# Patient Record
Sex: MALE | Race: WHITE | NOT HISPANIC OR LATINO | Employment: OTHER | URBAN - METROPOLITAN AREA
[De-identification: names, ages, dates, MRNs, and addresses within clinical notes are randomized per-mention and may not be internally consistent; named-entity substitution may affect disease eponyms.]

---

## 2018-10-13 ENCOUNTER — OFFICE VISIT (OUTPATIENT)
Dept: URGENT CARE | Facility: CLINIC | Age: 68
End: 2018-10-13
Payer: MEDICARE

## 2018-10-13 ENCOUNTER — APPOINTMENT (OUTPATIENT)
Dept: RADIOLOGY | Facility: CLINIC | Age: 68
End: 2018-10-13
Payer: MEDICARE

## 2018-10-13 VITALS
RESPIRATION RATE: 18 BRPM | SYSTOLIC BLOOD PRESSURE: 146 MMHG | OXYGEN SATURATION: 97 % | HEART RATE: 72 BPM | TEMPERATURE: 97.9 F | DIASTOLIC BLOOD PRESSURE: 71 MMHG

## 2018-10-13 DIAGNOSIS — S62.304A CLOSED NONDISPLACED FRACTURE OF FOURTH METACARPAL BONE OF RIGHT HAND, UNSPECIFIED PORTION OF METACARPAL, INITIAL ENCOUNTER: ICD-10-CM

## 2018-10-13 DIAGNOSIS — M79.641 RIGHT HAND PAIN: Primary | ICD-10-CM

## 2018-10-13 DIAGNOSIS — M79.641 RIGHT HAND PAIN: ICD-10-CM

## 2018-10-13 PROCEDURE — G0463 HOSPITAL OUTPT CLINIC VISIT: HCPCS | Performed by: PHYSICIAN ASSISTANT

## 2018-10-13 PROCEDURE — 73130 X-RAY EXAM OF HAND: CPT

## 2018-10-13 PROCEDURE — 29125 APPL SHORT ARM SPLINT STATIC: CPT | Performed by: PHYSICIAN ASSISTANT

## 2018-10-13 PROCEDURE — 99203 OFFICE O/P NEW LOW 30 MIN: CPT | Performed by: PHYSICIAN ASSISTANT

## 2018-10-13 RX ORDER — METOPROLOL SUCCINATE 25 MG/1
25 TABLET, EXTENDED RELEASE ORAL DAILY
COMMUNITY
Start: 2018-09-30 | End: 2019-10-02 | Stop reason: HOSPADM

## 2018-10-13 RX ORDER — APIXABAN 5 MG/1
5 TABLET, FILM COATED ORAL 2 TIMES DAILY
COMMUNITY
Start: 2018-09-07

## 2018-10-13 RX ORDER — BUDESONIDE AND FORMOTEROL FUMARATE DIHYDRATE 160; 4.5 UG/1; UG/1
2 AEROSOL RESPIRATORY (INHALATION) 2 TIMES DAILY
COMMUNITY
Start: 2018-08-29

## 2018-10-13 RX ORDER — LEVOTHYROXINE SODIUM 112 UG/1
112 TABLET ORAL DAILY
COMMUNITY
Start: 2018-10-01

## 2018-10-13 RX ORDER — LISINOPRIL 40 MG/1
40 TABLET ORAL DAILY
Status: ON HOLD | COMMUNITY
Start: 2018-10-01 | End: 2019-10-02 | Stop reason: SDUPTHER

## 2018-10-13 RX ORDER — SIMVASTATIN 20 MG
20 TABLET ORAL
COMMUNITY
Start: 2018-10-01

## 2018-10-13 NOTE — PROGRESS NOTES
[address]  (office) [phone]  (fax) [phone]        NAME: Corina Dotson is a 70 y o  male  : 1950    MRN: [id number]  DATE: 2018  TIME: 4:27 PM    Assessment and Plan   Right hand pain [M79 641]  1  Right hand pain  XR hand 3+ vw right   2  Closed nondisplaced fracture of fourth metacarpal bone of right hand, unspecified portion of metacarpal, initial encounter  Sling    Ambulatory referral to Orthopedic Surgery       Patient Instructions   Xray shows acute nondisplaced fracture of 4th metacarpal  Splint applied  Given splint instructions and to follow up with ortho on Monday  OTC IBU and or tylenol for pain  To keep splint and sling on until sees ortho  Patient did verbalize understanding  To present to the ER if symptoms worsen  Chief Complaint     Chief Complaint   Patient presents with    Hand Pain     Pt c/o right hand pain after feeling something pop earlier today  History of Present Illness   Anita Tirado presents to the clinic c/o    Hand Pain    The incident occurred 1 to 3 hours ago  The incident occurred at home  Injury mechanism: lifting a heavy coffee creamer out of the fridge  The pain is present in the right hand  The quality of the pain is described as aching  The pain does not radiate  The pain is moderate  The pain has been constant since the incident  Pertinent negatives include no chest pain, muscle weakness, numbness or tingling  The symptoms are aggravated by movement and palpation  He has tried nothing for the symptoms  The treatment provided no relief  Review of Systems   Review of Systems   Constitutional: Negative for activity change, appetite change, chills, diaphoresis, fatigue and fever  HENT: Negative for congestion, ear discharge, ear pain, facial swelling, rhinorrhea, sinus pain, sinus pressure, sneezing and sore throat      Eyes: Negative for photophobia, pain, discharge, redness, itching and visual disturbance  Respiratory: Negative for apnea, cough, chest tightness, shortness of breath and wheezing  Cardiovascular: Negative for chest pain  Gastrointestinal: Negative for abdominal distention, abdominal pain, anal bleeding, blood in stool, constipation, diarrhea, nausea and vomiting  Genitourinary: Negative for dysuria, flank pain, frequency, hematuria and urgency  Musculoskeletal: Positive for arthralgias  Negative for back pain, gait problem, joint swelling, myalgias, neck pain and neck stiffness  Skin: Negative for color change, rash and wound  Allergic/Immunologic: Negative for immunocompromised state  Neurological: Negative for dizziness, tingling, facial asymmetry, numbness and headaches  Hematological: Negative for adenopathy  Psychiatric/Behavioral: Negative for confusion and decreased concentration  Current Medications     Long-Term Prescriptions   Medication Sig Dispense Refill    ELIQUIS 5 MG       levothyroxine 112 mcg tablet       lisinopril (ZESTRIL) 40 mg tablet       metoprolol succinate (TOPROL-XL) 25 mg 24 hr tablet       simvastatin (ZOCOR) 20 mg tablet       SYMBICORT 160-4 5 MCG/ACT inhaler          Current Allergies     Allergies as of 10/13/2018    (No Known Allergies)            The following portions of the patient's history were reviewed and updated as appropriate: allergies, current medications, past family history, past medical history, past social history, past surgical history and problem list   No past medical history on file  No past surgical history on file  Social History     Social History    Marital status: /Civil Union     Spouse name: N/A    Number of children: N/A    Years of education: N/A     Occupational History    Not on file       Social History Main Topics    Smoking status: Not on file    Smokeless tobacco: Not on file    Alcohol use Not on file    Drug use: Unknown    Sexual activity: Not on file     Other Topics Concern    Not on file     Social History Narrative    No narrative on file       Objective   /71   Pulse 72   Temp 97 9 °F (36 6 °C)   Resp 18   SpO2 97%      Physical Exam     Physical Exam   Constitutional: He is oriented to person, place, and time  He appears well-developed and well-nourished  No distress  HENT:   Head: Normocephalic and atraumatic  Right Ear: Tympanic membrane and external ear normal    Left Ear: Tympanic membrane and external ear normal    Nose: Nose normal    Mouth/Throat: Oropharynx is clear and moist  No oropharyngeal exudate  Eyes: Pupils are equal, round, and reactive to light  Conjunctivae and EOM are normal  Right eye exhibits no discharge  Left eye exhibits no discharge  No scleral icterus  Neck: Normal range of motion  Neck supple  No JVD present  No tracheal deviation present  No thyromegaly present  Cardiovascular: Normal rate, regular rhythm and normal heart sounds  Exam reveals no gallop and no friction rub  No murmur heard  Pulmonary/Chest: Effort normal and breath sounds normal  No stridor  No respiratory distress  He has no wheezes  He has no rales  He exhibits no tenderness  Musculoskeletal: He exhibits tenderness  He exhibits no deformity  Right hand: He exhibits tenderness (around 4th metacarpal), bony tenderness and swelling (mild)  He exhibits normal range of motion and normal capillary refill  Normal sensation noted  Decreased sensation is not present in the ulnar distribution, is not present in the medial distribution and is not present in the radial distribution  Radial and ulnar pulse 2+ on right; neurovascularly intact   Lymphadenopathy:     He has no cervical adenopathy  Neurological: He is alert and oriented to person, place, and time  He has normal reflexes  Coordination normal    Skin: Skin is warm and dry  No rash noted  He is not diaphoretic  No erythema  No pallor     Psychiatric: He has a normal mood and affect  His behavior is normal  Judgment and thought content normal    Nursing note and vitals reviewed  Splint application  Date/Time: 10/13/2018 12:33 PM  Performed by: Grace Waldrop  Authorized by: Grace Waldrop     Consent:     Consent obtained:  Verbal    Consent given by:  Patient    Risks discussed:  Discoloration, numbness, pain and swelling    Alternatives discussed:  No treatment, delayed treatment, alternative treatment, observation and referral  Pre-procedure details:     Sensation:  Normal  Procedure details:     Laterality:  Right    Location:  Arm    Arm:  R lower arm    Strapping: no  Cast type:  Short arm    Splint type:  Ulnar gutter    Supplies:  Ortho-Glass, cotton padding and sling  Post-procedure details:     Pain:  Unchanged    Sensation:  Normal    Patient tolerance of procedure:   Tolerated well, no immediate complications       doron Bañuelos PA-C

## 2019-03-25 ENCOUNTER — ANESTHESIA EVENT (OUTPATIENT)
Dept: PERIOP | Facility: HOSPITAL | Age: 69
End: 2019-03-25
Payer: MEDICARE

## 2019-03-25 RX ORDER — ESOMEPRAZOLE MAGNESIUM 40 MG/1
40 CAPSULE, DELAYED RELEASE ORAL
COMMUNITY
End: 2021-09-21 | Stop reason: HOSPADM

## 2019-03-25 RX ORDER — ECHINACEA 400 MG
2000 CAPSULE ORAL DAILY
COMMUNITY

## 2019-03-25 RX ORDER — TRAVOPROST OPHTHALMIC SOLUTION 0.04 MG/ML
1 SOLUTION OPHTHALMIC
COMMUNITY

## 2019-03-25 RX ORDER — LORATADINE 10 MG/1
10 TABLET ORAL DAILY
COMMUNITY

## 2019-03-25 RX ORDER — ALBUTEROL SULFATE 90 UG/1
2 AEROSOL, METERED RESPIRATORY (INHALATION) EVERY 6 HOURS PRN
COMMUNITY

## 2019-03-25 NOTE — PRE-PROCEDURE INSTRUCTIONS
Pre-Surgery Instructions:   Medication Instructions    albuterol (PROVENTIL HFA,VENTOLIN HFA) 90 mcg/act inhaler Instructed patient per Anesthesia Guidelines   esomeprazole (NexIUM) 40 MG capsule Instructed patient per Anesthesia Guidelines   lisinopril (ZESTRIL) 40 mg tablet Instructed patient per Anesthesia Guidelines   loratadine (CLARITIN) 10 mg tablet Instructed patient per Anesthesia Guidelines   metoprolol succinate (TOPROL-XL) 25 mg 24 hr tablet Instructed patient per Anesthesia Guidelines   SYMBICORT 160-4 5 MCG/ACT inhaler Instructed patient per Anesthesia Guidelines

## 2019-03-25 NOTE — ANESTHESIA PREPROCEDURE EVALUATION
Review of Systems/Medical History  Patient summary reviewed  Chart reviewed      Cardiovascular  Exercise tolerance (METS): >4,  Hyperlipidemia, CAD , Dysrhythmias , atrial fibrillation,   Comment: Ischemic cardiomyopathy with h/o afib  Pt has pacemaker defibrillator implanted 1 yr ago ,  Pulmonary  Sleep apnea CPAP,        GI/Hepatic  Negative GI/hepatic ROS   GERD well controlled,             Endo/Other  Negative endo/other ROS      GYN  Negative gynecology ROS          Hematology  Negative hematology ROS      Musculoskeletal  Negative musculoskeletal ROS   Arthritis     Neurology  Negative neurology ROS      Psychology   Negative psychology ROS              Physical Exam    Airway    Mallampati score: II  TM Distance: >3 FB  Neck ROM: full     Dental       Cardiovascular  Cardiovascular exam normal    Pulmonary  Pulmonary exam normal     Other Findings        Anesthesia Plan  ASA Score- 2     Anesthesia Type- IV sedation with anesthesia with ASA Monitors  Additional Monitors: arterial line  Airway Plan:     Comment: discussed possible A line if BP unstable   Plan Factors-Patient not instructed to abstain from smoking on day of procedure  Patient did not smoke on day of surgery  Induction- intravenous  Postoperative Plan- Plan for postoperative opioid use  Informed Consent- Anesthetic plan and risks discussed with patient and spouse  I personally reviewed this patient with the CRNA  Discussed and agreed on the Anesthesia Plan with the CRNA  Vidal Rodriguez         No results found for: HGBA1C    No results found for: NA, K, CL, CO2, ANIONGAP, BUN, CREATININE, GLUCOSE, GLUF, CALCIUM, CORRECTEDCA, AST, ALT, ALKPHOS, PROT, BILITOT, EGFR    No results found for: WBC, HGB, HCT, MCV, PLT  Cbc ok   EKG incomplete RBBB   Pt on eliquis

## 2019-03-26 ENCOUNTER — ANESTHESIA (OUTPATIENT)
Dept: PERIOP | Facility: HOSPITAL | Age: 69
End: 2019-03-26
Payer: MEDICARE

## 2019-03-26 ENCOUNTER — HOSPITAL ENCOUNTER (OUTPATIENT)
Facility: HOSPITAL | Age: 69
Setting detail: OUTPATIENT SURGERY
Discharge: HOME | End: 2019-03-26
Attending: OPHTHALMOLOGY | Admitting: OPHTHALMOLOGY
Payer: MEDICARE

## 2019-03-26 VITALS
TEMPERATURE: 97.4 F | OXYGEN SATURATION: 94 % | RESPIRATION RATE: 18 BRPM | WEIGHT: 224 LBS | HEIGHT: 70 IN | SYSTOLIC BLOOD PRESSURE: 133 MMHG | DIASTOLIC BLOOD PRESSURE: 62 MMHG | BODY MASS INDEX: 32.07 KG/M2 | HEART RATE: 62 BPM

## 2019-03-26 PROBLEM — H25.811 COMBINED FORMS OF AGE-RELATED CATARACT OF RIGHT EYE: Status: ACTIVE | Noted: 2019-03-26

## 2019-03-26 PROBLEM — H25.811 COMBINED FORMS OF AGE-RELATED CATARACT OF RIGHT EYE: Status: RESOLVED | Noted: 2019-03-26 | Resolved: 2019-03-26

## 2019-03-26 PROCEDURE — V2788 PRESBYOPIA-CORRECT FUNCTION: HCPCS | Performed by: OPHTHALMOLOGY

## 2019-03-26 DEVICE — ACRYSOF(R) IQ RESTOR(R) MULTIFOCAL IOL, SINGLE-PIECE ACRYLIC FOLDABLE LENS, UV WITH BLUE LIGHTFILTER, 13.0MM LENGTH, 6.0MM APODIZEDDIFFRACTIVE ASPHERIC OPTIC.
Type: IMPLANTABLE DEVICE | Site: POSTERIOR CHAMBER | Status: NON-FUNCTIONAL
Brand: ACRYSOF® RESTOR®
Removed: 2019-04-09

## 2019-03-26 RX ORDER — FENTANYL CITRATE 50 UG/ML
INJECTION, SOLUTION INTRAMUSCULAR; INTRAVENOUS AS NEEDED
Status: DISCONTINUED | OUTPATIENT
Start: 2019-03-26 | End: 2019-03-26 | Stop reason: SURG

## 2019-03-26 RX ORDER — ONDANSETRON 4 MG/1
4 TABLET, ORALLY DISINTEGRATING ORAL ONCE
Status: COMPLETED | OUTPATIENT
Start: 2019-03-26 | End: 2019-03-26

## 2019-03-26 RX ORDER — HYDROMORPHONE HCL/PF 1 MG/ML
0.5 SYRINGE (ML) INJECTION
Status: DISCONTINUED | OUTPATIENT
Start: 2019-03-26 | End: 2019-03-26 | Stop reason: HOSPADM

## 2019-03-26 RX ORDER — LIDOCAINE HYDROCHLORIDE 20 MG/ML
INJECTION, SOLUTION EPIDURAL; INFILTRATION; INTRACAUDAL; PERINEURAL AS NEEDED
Status: DISCONTINUED | OUTPATIENT
Start: 2019-03-26 | End: 2019-03-26 | Stop reason: HOSPADM

## 2019-03-26 RX ORDER — LIDOCAINE HYDROCHLORIDE 10 MG/ML
INJECTION, SOLUTION EPIDURAL; INFILTRATION; INTRACAUDAL; PERINEURAL AS NEEDED
Status: DISCONTINUED | OUTPATIENT
Start: 2019-03-26 | End: 2019-03-26 | Stop reason: HOSPADM

## 2019-03-26 RX ORDER — PHENYLEPHRINE HCL 2.5 %
1 DROPS OPHTHALMIC (EYE)
Status: COMPLETED | OUTPATIENT
Start: 2019-03-26 | End: 2019-03-26

## 2019-03-26 RX ORDER — TETRACAINE HYDROCHLORIDE 5 MG/ML
SOLUTION OPHTHALMIC AS NEEDED
Status: DISCONTINUED | OUTPATIENT
Start: 2019-03-26 | End: 2019-03-26 | Stop reason: HOSPADM

## 2019-03-26 RX ORDER — LIDOCAINE HYDROCHLORIDE 20 MG/ML
JELLY TOPICAL AS NEEDED
Status: DISCONTINUED | OUTPATIENT
Start: 2019-03-26 | End: 2019-03-26 | Stop reason: HOSPADM

## 2019-03-26 RX ORDER — SODIUM CHLORIDE 9 MG/ML
50 INJECTION, SOLUTION INTRAVENOUS CONTINUOUS
Status: DISCONTINUED | OUTPATIENT
Start: 2019-03-27 | End: 2019-03-26 | Stop reason: HOSPADM

## 2019-03-26 RX ORDER — MIDAZOLAM HYDROCHLORIDE 1 MG/ML
INJECTION INTRAMUSCULAR; INTRAVENOUS AS NEEDED
Status: DISCONTINUED | OUTPATIENT
Start: 2019-03-26 | End: 2019-03-26 | Stop reason: SURG

## 2019-03-26 RX ORDER — KETOROLAC TROMETHAMINE 5 MG/ML
1 SOLUTION OPHTHALMIC
Status: DISPENSED | OUTPATIENT
Start: 2019-03-26 | End: 2019-03-26

## 2019-03-26 RX ORDER — NEOMYCIN SULFATE, POLYMYXIN B SULFATE, AND DEXAMETHASONE 3.5; 10000; 1 MG/G; [USP'U]/G; MG/G
OINTMENT OPHTHALMIC AS NEEDED
Status: DISCONTINUED | OUTPATIENT
Start: 2019-03-26 | End: 2019-03-26 | Stop reason: HOSPADM

## 2019-03-26 RX ORDER — CYCLOPENTOLATE HYDROCHLORIDE 10 MG/ML
1 SOLUTION/ DROPS OPHTHALMIC
Status: COMPLETED | OUTPATIENT
Start: 2019-03-26 | End: 2019-03-26

## 2019-03-26 RX ORDER — BALANCED SALT SOLUTION 6.4; .75; .48; .3; 3.9; 1.7 MG/ML; MG/ML; MG/ML; MG/ML; MG/ML; MG/ML
SOLUTION OPHTHALMIC AS NEEDED
Status: DISCONTINUED | OUTPATIENT
Start: 2019-03-26 | End: 2019-03-26 | Stop reason: HOSPADM

## 2019-03-26 RX ORDER — ACETAMINOPHEN 325 MG/1
650 TABLET ORAL EVERY 4 HOURS PRN
Status: DISCONTINUED | OUTPATIENT
Start: 2019-03-26 | End: 2019-03-26 | Stop reason: HOSPADM

## 2019-03-26 RX ORDER — ACETAZOLAMIDE 250 MG/1
250 TABLET ORAL ONCE
Status: COMPLETED | OUTPATIENT
Start: 2019-03-26 | End: 2019-03-26

## 2019-03-26 RX ORDER — TROPICAMIDE 10 MG/ML
1 SOLUTION/ DROPS OPHTHALMIC
Status: COMPLETED | OUTPATIENT
Start: 2019-03-26 | End: 2019-03-26

## 2019-03-26 RX ADMIN — MIDAZOLAM HYDROCHLORIDE 1 MG: 1 INJECTION, SOLUTION INTRAMUSCULAR; INTRAVENOUS at 09:35

## 2019-03-26 RX ADMIN — KETOROLAC TROMETHAMINE 1 DROP: 5 SOLUTION OPHTHALMIC at 08:15

## 2019-03-26 RX ADMIN — TROPICAMIDE 1 DROP: 10 SOLUTION/ DROPS OPHTHALMIC at 08:29

## 2019-03-26 RX ADMIN — TROPICAMIDE 1 DROP: 10 SOLUTION/ DROPS OPHTHALMIC at 09:03

## 2019-03-26 RX ADMIN — HYDROMORPHONE HYDROCHLORIDE 0.5 MG: 1 INJECTION, SOLUTION INTRAMUSCULAR; INTRAVENOUS; SUBCUTANEOUS at 10:13

## 2019-03-26 RX ADMIN — MIDAZOLAM HYDROCHLORIDE 1 MG: 1 INJECTION, SOLUTION INTRAMUSCULAR; INTRAVENOUS at 09:33

## 2019-03-26 RX ADMIN — PHENYLEPHRINE HYDROCHLORIDE 1 DROP: 25 SOLUTION/ DROPS OPHTHALMIC at 08:15

## 2019-03-26 RX ADMIN — HYDROMORPHONE HYDROCHLORIDE 0.5 MG: 1 INJECTION, SOLUTION INTRAMUSCULAR; INTRAVENOUS; SUBCUTANEOUS at 10:23

## 2019-03-26 RX ADMIN — TROPICAMIDE 1 DROP: 10 SOLUTION/ DROPS OPHTHALMIC at 08:49

## 2019-03-26 RX ADMIN — TROPICAMIDE 1 DROP: 10 SOLUTION/ DROPS OPHTHALMIC at 08:16

## 2019-03-26 RX ADMIN — PHENYLEPHRINE HYDROCHLORIDE 1 DROP: 25 SOLUTION/ DROPS OPHTHALMIC at 09:04

## 2019-03-26 RX ADMIN — CYCLOPENTOLATE HYDROCHLORIDE 1 DROP: 10 SOLUTION/ DROPS OPHTHALMIC at 08:49

## 2019-03-26 RX ADMIN — ONDANSETRON 4 MG: 4 TABLET, ORALLY DISINTEGRATING ORAL at 13:17

## 2019-03-26 RX ADMIN — CYCLOPENTOLATE HYDROCHLORIDE 1 DROP: 10 SOLUTION/ DROPS OPHTHALMIC at 08:15

## 2019-03-26 RX ADMIN — ACETAZOLAMIDE 250 MG: 250 TABLET ORAL at 12:51

## 2019-03-26 RX ADMIN — CYCLOPENTOLATE HYDROCHLORIDE 1 DROP: 10 SOLUTION/ DROPS OPHTHALMIC at 09:03

## 2019-03-26 RX ADMIN — SODIUM CHLORIDE 50 ML/HR: 9 INJECTION, SOLUTION INTRAVENOUS at 09:02

## 2019-03-26 RX ADMIN — PHENYLEPHRINE HYDROCHLORIDE 1 DROP: 25 SOLUTION/ DROPS OPHTHALMIC at 08:49

## 2019-03-26 RX ADMIN — KETOROLAC TROMETHAMINE 1 DROP: 5 SOLUTION OPHTHALMIC at 08:49

## 2019-03-26 RX ADMIN — FENTANYL CITRATE 50 MCG: 50 INJECTION INTRAMUSCULAR; INTRAVENOUS at 09:35

## 2019-03-26 RX ADMIN — CYCLOPENTOLATE HYDROCHLORIDE 1 DROP: 10 SOLUTION/ DROPS OPHTHALMIC at 08:28

## 2019-03-26 RX ADMIN — FENTANYL CITRATE 50 MCG: 50 INJECTION INTRAMUSCULAR; INTRAVENOUS at 09:33

## 2019-03-26 RX ADMIN — PHENYLEPHRINE HYDROCHLORIDE 1 DROP: 25 SOLUTION/ DROPS OPHTHALMIC at 08:28

## 2019-03-26 NOTE — DISCHARGE INSTR - AVS FIRST PAGE
Patient to follow up with Filomena De La O MD in the office tomorrow  Please bring your drops and your folder with your instruction sheet with you to the office  Please take your eye vitamins today and your acetazolamide, if prescribed, today with dinner and at bedtime, and again in the morning before your appointment  Please also read the post-op instruction sheet provided by our office

## 2019-03-26 NOTE — INTERVAL H&P NOTE
H&P reviewed  After examining the patient I find no changes in the patients condition since the H&P had been written   Right eye today

## 2019-03-26 NOTE — OP NOTE
OPERATIVE REPORT  PATIENT NAME: Corina Dotson    :  1950  MRN: [id number]  Pt Location:  OR ROOM 01    SURGERY DATE: 3/26/2019    Surgeon(s) and Role:     * Elio Andrade MD - Primary    Preop Diagnosis:  Combined forms of age-related cataract of right eye [H25 811]    Post-Op Diagnosis Codes:     * Combined forms of age-related cataract of right eye [H25 811]    Procedure(s) (LRB):  PHACO W/IOL & LRI (Right)    Specimen(s):  * No specimens in log *    Estimated Blood Loss:   Minimal    Drains:  * No LDAs found *    Anesthesia Type:   IV Sedation with Anesthesia    Operative Indications:  Combined forms of age-related cataract of right eye [H25 811]      Operative Findings:      Complications:   None    Procedure and Technique:    Phaco Cataract Extraction right eye with placement of SV25T0 Restor +2 5 IOL 16 0 Dioptor serial # [id number]  Clear corneal incision with LRI 70degrees around the 118 axis      The patient was brought to the operating room and placed supine on the operating room table after being identified by the surgeon  The patient was then given Alcaine  drops followed by 2% Lidocaine gel to the operative eye  A Nadbath block was given behind the ear on the operative site consisting of 0 5 mL of 2% plain Xylocaine  The lashes were then scrubbed with 5% Povidone iodine and then more of the 2% Lidocaine gel was placed in the eye  The face was then prepped and draped in the usual sterile fashion  A wire lid speculum was placed and the microscope was brought into position  A wick drain was placed in the inferior fornix  The eye was stabilized and the LRI was marked with a forceps  A 600 micron protected LRI knife was used to cut the lri in the premarked amount in the proper axis  This was checked with a weck cell and seen to be proper depth  Ocucoat was placed on the eye      Next, the eye was entered using the 2 8 blade (keratome) and Viscoelastic material was injected into the anterior chamber to deepen it  A paracentesis stab-wound was made with a #15 blade at the 2:30 position  Using the capsulorrhexis needle a large capsulorrhexis was performed in a curvilinear continuous fashion and then the anterior capsule piece was removed from the eye using forceps  Hydrodissection and hydrodelineation were then carried out using BSS from the bottle until good fluid waves were seen and the nucleus was loosened  Next the phacoemulsification handpiece was brought into the eye and grooving begun  The lens was then rotated and the nucleus was grooved further and cracked in half  Each half was then sculpted and cracked into quadrants  The quadrants were then removed using a pulse technique for emulsification and then the epinuclear shell was suctioned out using the bimodal setting  The I/A instrument was then inserted into the eye and was used to remove all remaining cortex  The anterior rim and posterior capsule were then polished using both the I/A on Cap Vac and using a capsule polisher  The bag was then inflated with Provisc  The intraocular lens wasinjected into the capsular bag and rotated into place  The lens was seen to be well centered  The I/A instrument was then used to remove all remaining Healon and Miochol was used to bring down the pupil  The lid speculum was removed and the eye was dressed with Maxitrol ointment in the eye  An eye pad was placed and taped into position and a shield was placed and taped into position  The patient tolerated the procedure extremely well and was taken to the recovery room in excellent condition       I was present for the entire procedure    Patient Disposition:  PACU     SIGNATURE: Felix Moreno MD  DATE: March 26, 2019  TIME: 10:08 AM

## 2019-03-26 NOTE — PERIOPERATIVE NURSING NOTE
Pt discharge via wheelchair, VSS, AAOx4  All discharge instructions reviewed, pt verbalized understanding, all questions answered  Nausea improved slightly  Dressing clean, dry, and intact  Eye shield in place

## 2019-03-26 NOTE — DISCHARGE INSTRUCTIONS
Cataracts   WHAT YOU NEED TO KNOW:   A cataract is a clouding of the eye lens  The lens is the opening where light passes through the eye  It is normally clear and focuses what you see onto the retina (back of the eye)  With cataracts, the cloudy lens makes it hard for light to pass through  This causes problems with correctly focusing what you see on the retina  Your vision may be cloudy, hazy, and blurred  You may develop a cataract in one or both eyes  DISCHARGE INSTRUCTIONS:   Return to the emergency department if:   · You suddenly lose your eyesight  · You feel a sudden, sharp pain in your eye  Contact your healthcare provider if:   · You have a fever  · You have chills, a cough, or feel weak and achy  · You have questions or concerns about your condition or care  Follow up with your healthcare provider as directed:  Write down your questions so you remember to ask them during your visits  Protect your eyes:   · Wear sunglasses  to protect your eyes from the sunlight and prevent eye discomfort  Make sure the sunglasses have UV protection  · Do not smoke  Nicotine and other chemicals in cigarettes and cigars can cause lung damage  Ask your healthcare provider for information if you currently smoke and need help to quit  E-cigarettes or smokeless tobacco still contain nicotine  Talk to your healthcare provider before you use these products  © 2017 [facility] Information is for End User's use only and may not be sold, redistributed or otherwise used for commercial purposes  All illustrations and images included in CareNotes® are the copyrighted property of A D A M [employer] Inc  or [employer] Cur[employer]  The above information is an  only  It is not intended as medical advice for individual conditions or treatments  Talk to your doctor, nurse or pharmacist before following any medical regimen to see if it is safe and effective for you

## 2019-03-26 NOTE — PERIOPERATIVE NURSING NOTE
Received pt from PACU, AAOx4, VSS, IV infusing  Pt denies pain, dizziness and nausea  Dressing clean, dry and intact, eye shield in place   Pt tolerating po/

## 2019-04-08 ENCOUNTER — ANESTHESIA EVENT (OUTPATIENT)
Dept: PERIOP | Facility: HOSPITAL | Age: 69
End: 2019-04-08
Payer: MEDICARE

## 2019-04-09 ENCOUNTER — HOSPITAL ENCOUNTER (OUTPATIENT)
Facility: HOSPITAL | Age: 69
Setting detail: OUTPATIENT SURGERY
Discharge: HOME | End: 2019-04-09
Attending: OPHTHALMOLOGY | Admitting: OPHTHALMOLOGY
Payer: MEDICARE

## 2019-04-09 ENCOUNTER — ANESTHESIA (OUTPATIENT)
Dept: PERIOP | Facility: HOSPITAL | Age: 69
End: 2019-04-09
Payer: MEDICARE

## 2019-04-09 VITALS
DIASTOLIC BLOOD PRESSURE: 80 MMHG | OXYGEN SATURATION: 96 % | RESPIRATION RATE: 20 BRPM | SYSTOLIC BLOOD PRESSURE: 127 MMHG | TEMPERATURE: 97.5 F | HEART RATE: 61 BPM

## 2019-04-09 DIAGNOSIS — T85.29XA OTHER MECHANICAL COMPLICATION OF INTRAOCULAR LENS, INITIAL ENCOUNTER: ICD-10-CM

## 2019-04-09 PROCEDURE — V2788 PRESBYOPIA-CORRECT FUNCTION: HCPCS | Performed by: OPHTHALMOLOGY

## 2019-04-09 DEVICE — ACRYSOF(R) IQ RESTOR(R) MULTIFOCAL IOL, SINGLE-PIECE ACRYLIC FOLDABLE LENS, UV WITH BLUE LIGHTFILTER, 13.0MM LENGTH, 6.0MM APODIZEDDIFFRACTIVE ASPHERIC OPTIC.
Type: IMPLANTABLE DEVICE | Status: FUNCTIONAL
Brand: ACRYSOF® RESTOR®

## 2019-04-09 RX ORDER — ONDANSETRON 2 MG/ML
INJECTION INTRAMUSCULAR; INTRAVENOUS AS NEEDED
Status: DISCONTINUED | OUTPATIENT
Start: 2019-04-09 | End: 2019-04-09 | Stop reason: SURG

## 2019-04-09 RX ORDER — TETRACAINE HYDROCHLORIDE 5 MG/ML
SOLUTION OPHTHALMIC AS NEEDED
Status: DISCONTINUED | OUTPATIENT
Start: 2019-04-09 | End: 2019-04-09 | Stop reason: HOSPADM

## 2019-04-09 RX ORDER — DEXAMETHASONE SODIUM PHOSPHATE 4 MG/ML
INJECTION, SOLUTION INTRA-ARTICULAR; INTRALESIONAL; INTRAMUSCULAR; INTRAVENOUS; SOFT TISSUE AS NEEDED
Status: DISCONTINUED | OUTPATIENT
Start: 2019-04-09 | End: 2019-04-09 | Stop reason: SURG

## 2019-04-09 RX ORDER — LIDOCAINE HYDROCHLORIDE 20 MG/ML
JELLY TOPICAL AS NEEDED
Status: DISCONTINUED | OUTPATIENT
Start: 2019-04-09 | End: 2019-04-09 | Stop reason: HOSPADM

## 2019-04-09 RX ORDER — PHENYLEPHRINE HCL 2.5 %
1 DROPS OPHTHALMIC (EYE)
Status: COMPLETED | OUTPATIENT
Start: 2019-04-09 | End: 2019-04-09

## 2019-04-09 RX ORDER — SODIUM CHLORIDE 9 MG/ML
125 INJECTION, SOLUTION INTRAVENOUS CONTINUOUS
Status: DISCONTINUED | OUTPATIENT
Start: 2019-04-09 | End: 2019-04-09 | Stop reason: HOSPADM

## 2019-04-09 RX ORDER — NEOMYCIN SULFATE, POLYMYXIN B SULFATE, AND DEXAMETHASONE 3.5; 10000; 1 MG/G; [USP'U]/G; MG/G
OINTMENT OPHTHALMIC AS NEEDED
Status: DISCONTINUED | OUTPATIENT
Start: 2019-04-09 | End: 2019-04-09 | Stop reason: HOSPADM

## 2019-04-09 RX ORDER — FENTANYL CITRATE/PF 50 MCG/ML
25 SYRINGE (ML) INJECTION
Status: DISCONTINUED | OUTPATIENT
Start: 2019-04-09 | End: 2019-04-09 | Stop reason: HOSPADM

## 2019-04-09 RX ORDER — ACETAMINOPHEN 325 MG/1
650 TABLET ORAL EVERY 4 HOURS PRN
Status: DISCONTINUED | OUTPATIENT
Start: 2019-04-09 | End: 2019-04-09 | Stop reason: HOSPADM

## 2019-04-09 RX ORDER — LIDOCAINE HYDROCHLORIDE 20 MG/ML
INJECTION, SOLUTION EPIDURAL; INFILTRATION; INTRACAUDAL; PERINEURAL AS NEEDED
Status: DISCONTINUED | OUTPATIENT
Start: 2019-04-09 | End: 2019-04-09 | Stop reason: HOSPADM

## 2019-04-09 RX ORDER — CYCLOPENTOLATE HYDROCHLORIDE 10 MG/ML
1 SOLUTION/ DROPS OPHTHALMIC
Status: COMPLETED | OUTPATIENT
Start: 2019-04-09 | End: 2019-04-09

## 2019-04-09 RX ORDER — LIDOCAINE HYDROCHLORIDE 10 MG/ML
INJECTION, SOLUTION EPIDURAL; INFILTRATION; INTRACAUDAL; PERINEURAL AS NEEDED
Status: DISCONTINUED | OUTPATIENT
Start: 2019-04-09 | End: 2019-04-09 | Stop reason: HOSPADM

## 2019-04-09 RX ORDER — FENTANYL CITRATE 50 UG/ML
INJECTION, SOLUTION INTRAMUSCULAR; INTRAVENOUS AS NEEDED
Status: DISCONTINUED | OUTPATIENT
Start: 2019-04-09 | End: 2019-04-09 | Stop reason: SURG

## 2019-04-09 RX ORDER — BALANCED SALT SOLUTION 6.4; .75; .48; .3; 3.9; 1.7 MG/ML; MG/ML; MG/ML; MG/ML; MG/ML; MG/ML
SOLUTION OPHTHALMIC AS NEEDED
Status: DISCONTINUED | OUTPATIENT
Start: 2019-04-09 | End: 2019-04-09 | Stop reason: HOSPADM

## 2019-04-09 RX ORDER — PROPOFOL 10 MG/ML
INJECTION, EMULSION INTRAVENOUS AS NEEDED
Status: DISCONTINUED | OUTPATIENT
Start: 2019-04-09 | End: 2019-04-09 | Stop reason: SURG

## 2019-04-09 RX ORDER — MIDAZOLAM HYDROCHLORIDE 1 MG/ML
INJECTION INTRAMUSCULAR; INTRAVENOUS AS NEEDED
Status: DISCONTINUED | OUTPATIENT
Start: 2019-04-09 | End: 2019-04-09 | Stop reason: SURG

## 2019-04-09 RX ORDER — SCOLOPAMINE TRANSDERMAL SYSTEM 1 MG/1
1 PATCH, EXTENDED RELEASE TRANSDERMAL
Status: DISCONTINUED | OUTPATIENT
Start: 2019-04-09 | End: 2019-04-09 | Stop reason: HOSPADM

## 2019-04-09 RX ORDER — TROPICAMIDE 10 MG/ML
1 SOLUTION/ DROPS OPHTHALMIC
Status: COMPLETED | OUTPATIENT
Start: 2019-04-09 | End: 2019-04-09

## 2019-04-09 RX ADMIN — PHENYLEPHRINE HYDROCHLORIDE 1 DROP: 25 SOLUTION/ DROPS OPHTHALMIC at 08:39

## 2019-04-09 RX ADMIN — CYCLOPENTOLATE HYDROCHLORIDE 1 DROP: 10 SOLUTION/ DROPS OPHTHALMIC at 08:39

## 2019-04-09 RX ADMIN — CYCLOPENTOLATE HYDROCHLORIDE 1 DROP: 10 SOLUTION/ DROPS OPHTHALMIC at 08:19

## 2019-04-09 RX ADMIN — ACETAMINOPHEN 650 MG: 325 TABLET ORAL at 11:57

## 2019-04-09 RX ADMIN — TROPICAMIDE 1 DROP: 10 SOLUTION/ DROPS OPHTHALMIC at 08:19

## 2019-04-09 RX ADMIN — TROPICAMIDE 1 DROP: 10 SOLUTION/ DROPS OPHTHALMIC at 08:01

## 2019-04-09 RX ADMIN — MIDAZOLAM HYDROCHLORIDE 2 MG: 1 INJECTION, SOLUTION INTRAMUSCULAR; INTRAVENOUS at 09:48

## 2019-04-09 RX ADMIN — DEXAMETHASONE SODIUM PHOSPHATE 4 MG: 4 INJECTION, SOLUTION INTRA-ARTICULAR; INTRALESIONAL; INTRAMUSCULAR; INTRAVENOUS; SOFT TISSUE at 09:55

## 2019-04-09 RX ADMIN — TROPICAMIDE 1 DROP: 10 SOLUTION/ DROPS OPHTHALMIC at 07:44

## 2019-04-09 RX ADMIN — SODIUM CHLORIDE: 9 INJECTION, SOLUTION INTRAVENOUS at 09:00

## 2019-04-09 RX ADMIN — CYCLOPENTOLATE HYDROCHLORIDE 1 DROP: 10 SOLUTION/ DROPS OPHTHALMIC at 08:01

## 2019-04-09 RX ADMIN — ONDANSETRON HYDROCHLORIDE 4 MG: 2 INJECTION, SOLUTION INTRAMUSCULAR; INTRAVENOUS at 09:55

## 2019-04-09 RX ADMIN — PROPOFOL 30 MG: 10 INJECTION, EMULSION INTRAVENOUS at 09:54

## 2019-04-09 RX ADMIN — CYCLOPENTOLATE HYDROCHLORIDE 1 DROP: 10 SOLUTION/ DROPS OPHTHALMIC at 07:44

## 2019-04-09 RX ADMIN — FENTANYL CITRATE 50 MCG: 50 INJECTION INTRAMUSCULAR; INTRAVENOUS at 09:51

## 2019-04-09 RX ADMIN — TROPICAMIDE 1 DROP: 10 SOLUTION/ DROPS OPHTHALMIC at 08:39

## 2019-04-09 RX ADMIN — SCOPALAMINE 1 PATCH: 1 PATCH, EXTENDED RELEASE TRANSDERMAL at 08:04

## 2019-04-09 RX ADMIN — FENTANYL CITRATE 25 MCG: 50 INJECTION INTRAMUSCULAR; INTRAVENOUS at 10:50

## 2019-04-09 RX ADMIN — PHENYLEPHRINE HYDROCHLORIDE 1 DROP: 25 SOLUTION/ DROPS OPHTHALMIC at 07:44

## 2019-04-09 RX ADMIN — PHENYLEPHRINE HYDROCHLORIDE 1 DROP: 25 SOLUTION/ DROPS OPHTHALMIC at 08:01

## 2019-04-09 RX ADMIN — SODIUM CHLORIDE 125 ML/HR: 9 INJECTION, SOLUTION INTRAVENOUS at 09:17

## 2019-04-09 RX ADMIN — PROPOFOL 30 MG: 10 INJECTION, EMULSION INTRAVENOUS at 10:31

## 2019-04-09 RX ADMIN — PHENYLEPHRINE HYDROCHLORIDE 1 DROP: 25 SOLUTION/ DROPS OPHTHALMIC at 08:19

## 2019-04-09 RX ADMIN — FENTANYL CITRATE 50 MCG: 50 INJECTION INTRAMUSCULAR; INTRAVENOUS at 09:48

## 2019-04-09 RX ADMIN — FENTANYL CITRATE 25 MCG: 50 INJECTION INTRAMUSCULAR; INTRAVENOUS at 11:13

## 2019-05-07 ENCOUNTER — ANESTHESIA (OUTPATIENT)
Dept: PERIOP | Facility: HOSPITAL | Age: 69
End: 2019-05-07
Payer: MEDICARE

## 2019-05-07 ENCOUNTER — ANESTHESIA EVENT (OUTPATIENT)
Dept: PERIOP | Facility: HOSPITAL | Age: 69
End: 2019-05-07
Payer: MEDICARE

## 2019-05-07 ENCOUNTER — HOSPITAL ENCOUNTER (OUTPATIENT)
Facility: HOSPITAL | Age: 69
Setting detail: OUTPATIENT SURGERY
Discharge: HOME | End: 2019-05-07
Attending: OPHTHALMOLOGY | Admitting: OPHTHALMOLOGY
Payer: MEDICARE

## 2019-05-07 VITALS
DIASTOLIC BLOOD PRESSURE: 70 MMHG | OXYGEN SATURATION: 92 % | TEMPERATURE: 96.6 F | SYSTOLIC BLOOD PRESSURE: 122 MMHG | HEART RATE: 61 BPM | RESPIRATION RATE: 16 BRPM

## 2019-05-07 PROBLEM — H25.812 COMBINED FORMS OF AGE-RELATED CATARACT OF LEFT EYE: Status: ACTIVE | Noted: 2019-05-07

## 2019-05-07 PROBLEM — H25.812 COMBINED FORMS OF AGE-RELATED CATARACT OF LEFT EYE: Status: RESOLVED | Noted: 2019-05-07 | Resolved: 2019-05-07

## 2019-05-07 PROCEDURE — V2788 PRESBYOPIA-CORRECT FUNCTION: HCPCS | Performed by: OPHTHALMOLOGY

## 2019-05-07 DEVICE — ACRYSOF(R) IQ RESTOR(R) MULTIFOCAL IOL, SINGLE-PIECE ACRYLIC FOLDABLE LENS, UV WITH BLUE LIGHTFILTER, 13.0MM LENGTH, 6.0MM APODIZEDDIFFRACTIVE ASPHERIC OPTIC.
Type: IMPLANTABLE DEVICE | Site: POSTERIOR CHAMBER | Status: FUNCTIONAL
Brand: ACRYSOF® RESTOR®

## 2019-05-07 RX ORDER — MIDAZOLAM HYDROCHLORIDE 1 MG/ML
INJECTION INTRAMUSCULAR; INTRAVENOUS AS NEEDED
Status: DISCONTINUED | OUTPATIENT
Start: 2019-05-07 | End: 2019-05-07 | Stop reason: SURG

## 2019-05-07 RX ORDER — ACETAMINOPHEN 325 MG/1
650 TABLET ORAL EVERY 4 HOURS PRN
Status: DISCONTINUED | OUTPATIENT
Start: 2019-05-07 | End: 2019-05-07 | Stop reason: HOSPADM

## 2019-05-07 RX ORDER — BALANCED SALT SOLUTION 6.4; .75; .48; .3; 3.9; 1.7 MG/ML; MG/ML; MG/ML; MG/ML; MG/ML; MG/ML
SOLUTION OPHTHALMIC AS NEEDED
Status: DISCONTINUED | OUTPATIENT
Start: 2019-05-07 | End: 2019-05-07 | Stop reason: HOSPADM

## 2019-05-07 RX ORDER — SCOLOPAMINE TRANSDERMAL SYSTEM 1 MG/1
1 PATCH, EXTENDED RELEASE TRANSDERMAL
Status: DISCONTINUED | OUTPATIENT
Start: 2019-05-07 | End: 2019-05-07 | Stop reason: HOSPADM

## 2019-05-07 RX ORDER — FENTANYL CITRATE 50 UG/ML
INJECTION, SOLUTION INTRAMUSCULAR; INTRAVENOUS AS NEEDED
Status: DISCONTINUED | OUTPATIENT
Start: 2019-05-07 | End: 2019-05-07 | Stop reason: SURG

## 2019-05-07 RX ORDER — CYCLOPENTOLATE HYDROCHLORIDE 10 MG/ML
1 SOLUTION/ DROPS OPHTHALMIC
Status: DISPENSED | OUTPATIENT
Start: 2019-05-07 | End: 2019-05-07

## 2019-05-07 RX ORDER — LIDOCAINE HYDROCHLORIDE 10 MG/ML
INJECTION, SOLUTION EPIDURAL; INFILTRATION; INTRACAUDAL; PERINEURAL AS NEEDED
Status: DISCONTINUED | OUTPATIENT
Start: 2019-05-07 | End: 2019-05-07 | Stop reason: HOSPADM

## 2019-05-07 RX ORDER — LIDOCAINE HYDROCHLORIDE 20 MG/ML
JELLY TOPICAL AS NEEDED
Status: DISCONTINUED | OUTPATIENT
Start: 2019-05-07 | End: 2019-05-07 | Stop reason: HOSPADM

## 2019-05-07 RX ORDER — LIDOCAINE HYDROCHLORIDE 20 MG/ML
INJECTION, SOLUTION EPIDURAL; INFILTRATION; INTRACAUDAL; PERINEURAL AS NEEDED
Status: DISCONTINUED | OUTPATIENT
Start: 2019-05-07 | End: 2019-05-07 | Stop reason: HOSPADM

## 2019-05-07 RX ORDER — PROPOFOL 10 MG/ML
INJECTION, EMULSION INTRAVENOUS AS NEEDED
Status: DISCONTINUED | OUTPATIENT
Start: 2019-05-07 | End: 2019-05-07 | Stop reason: SURG

## 2019-05-07 RX ORDER — FENTANYL CITRATE/PF 50 MCG/ML
25 SYRINGE (ML) INJECTION
Status: DISCONTINUED | OUTPATIENT
Start: 2019-05-07 | End: 2019-05-07 | Stop reason: HOSPADM

## 2019-05-07 RX ORDER — ACETAZOLAMIDE 250 MG/1
250 TABLET ORAL ONCE
Status: COMPLETED | OUTPATIENT
Start: 2019-05-07 | End: 2019-05-07

## 2019-05-07 RX ORDER — TROPICAMIDE 10 MG/ML
1 SOLUTION/ DROPS OPHTHALMIC
Status: DISPENSED | OUTPATIENT
Start: 2019-05-07 | End: 2019-05-07

## 2019-05-07 RX ORDER — KETOROLAC TROMETHAMINE 5 MG/ML
1 SOLUTION OPHTHALMIC
Status: DISPENSED | OUTPATIENT
Start: 2019-05-07 | End: 2019-05-07

## 2019-05-07 RX ORDER — TETRACAINE HYDROCHLORIDE 5 MG/ML
SOLUTION OPHTHALMIC AS NEEDED
Status: DISCONTINUED | OUTPATIENT
Start: 2019-05-07 | End: 2019-05-07 | Stop reason: HOSPADM

## 2019-05-07 RX ORDER — DEXAMETHASONE SODIUM PHOSPHATE 4 MG/ML
INJECTION, SOLUTION INTRA-ARTICULAR; INTRALESIONAL; INTRAMUSCULAR; INTRAVENOUS; SOFT TISSUE AS NEEDED
Status: DISCONTINUED | OUTPATIENT
Start: 2019-05-07 | End: 2019-05-07 | Stop reason: SURG

## 2019-05-07 RX ORDER — NEOMYCIN SULFATE, POLYMYXIN B SULFATE, AND DEXAMETHASONE 3.5; 10000; 1 MG/G; [USP'U]/G; MG/G
OINTMENT OPHTHALMIC AS NEEDED
Status: DISCONTINUED | OUTPATIENT
Start: 2019-05-07 | End: 2019-05-07 | Stop reason: HOSPADM

## 2019-05-07 RX ORDER — PHENYLEPHRINE HCL 2.5 %
1 DROPS OPHTHALMIC (EYE)
Status: DISPENSED | OUTPATIENT
Start: 2019-05-07 | End: 2019-05-07

## 2019-05-07 RX ORDER — SODIUM CHLORIDE 9 MG/ML
125 INJECTION, SOLUTION INTRAVENOUS CONTINUOUS
Status: DISCONTINUED | OUTPATIENT
Start: 2019-05-07 | End: 2019-05-07 | Stop reason: HOSPADM

## 2019-05-07 RX ADMIN — ACETAZOLAMIDE 250 MG: 250 TABLET ORAL at 11:34

## 2019-05-07 RX ADMIN — FENTANYL CITRATE 25 MCG: 50 INJECTION INTRAMUSCULAR; INTRAVENOUS at 10:10

## 2019-05-07 RX ADMIN — PROPOFOL 25 MG: 10 INJECTION, EMULSION INTRAVENOUS at 10:07

## 2019-05-07 RX ADMIN — PHENYLEPHRINE HYDROCHLORIDE 1 DROP: 25 SOLUTION/ DROPS OPHTHALMIC at 09:29

## 2019-05-07 RX ADMIN — FENTANYL CITRATE 50 MCG: 50 INJECTION INTRAMUSCULAR; INTRAVENOUS at 10:00

## 2019-05-07 RX ADMIN — SODIUM CHLORIDE 125 ML/HR: 9 INJECTION, SOLUTION INTRAVENOUS at 09:49

## 2019-05-07 RX ADMIN — ACETAMINOPHEN 650 MG: 325 TABLET ORAL at 11:33

## 2019-05-07 RX ADMIN — KETOROLAC TROMETHAMINE 1 DROP: 5 SOLUTION OPHTHALMIC at 09:29

## 2019-05-07 RX ADMIN — FENTANYL CITRATE 25 MCG: 50 INJECTION INTRAMUSCULAR; INTRAVENOUS at 10:44

## 2019-05-07 RX ADMIN — PHENYLEPHRINE HYDROCHLORIDE 1 DROP: 25 SOLUTION/ DROPS OPHTHALMIC at 09:04

## 2019-05-07 RX ADMIN — CYCLOPENTOLATE HYDROCHLORIDE 1 DROP: 10 SOLUTION/ DROPS OPHTHALMIC at 09:04

## 2019-05-07 RX ADMIN — KETOROLAC TROMETHAMINE 1 DROP: 5 SOLUTION OPHTHALMIC at 09:04

## 2019-05-07 RX ADMIN — PROPOFOL 10 MG: 10 INJECTION, EMULSION INTRAVENOUS at 10:10

## 2019-05-07 RX ADMIN — PHENYLEPHRINE HYDROCHLORIDE 1 DROP: 25 SOLUTION/ DROPS OPHTHALMIC at 09:48

## 2019-05-07 RX ADMIN — SCOPALAMINE 1 PATCH: 1 PATCH, EXTENDED RELEASE TRANSDERMAL at 09:08

## 2019-05-07 RX ADMIN — TROPICAMIDE 1 DROP: 10 SOLUTION/ DROPS OPHTHALMIC at 09:05

## 2019-05-07 RX ADMIN — DEXAMETHASONE SODIUM PHOSPHATE 12 MG: 4 INJECTION, SOLUTION INTRA-ARTICULAR; INTRALESIONAL; INTRAMUSCULAR; INTRAVENOUS; SOFT TISSUE at 10:25

## 2019-05-07 RX ADMIN — MIDAZOLAM HYDROCHLORIDE 2 MG: 1 INJECTION, SOLUTION INTRAMUSCULAR; INTRAVENOUS at 10:00

## 2019-05-07 RX ADMIN — CYCLOPENTOLATE HYDROCHLORIDE 1 DROP: 10 SOLUTION/ DROPS OPHTHALMIC at 09:47

## 2019-05-07 RX ADMIN — TROPICAMIDE 1 DROP: 10 SOLUTION/ DROPS OPHTHALMIC at 09:29

## 2019-05-07 RX ADMIN — CYCLOPENTOLATE HYDROCHLORIDE 1 DROP: 10 SOLUTION/ DROPS OPHTHALMIC at 09:29

## 2019-05-07 RX ADMIN — PROPOFOL 25 MG: 10 INJECTION, EMULSION INTRAVENOUS at 10:00

## 2019-05-07 RX ADMIN — TROPICAMIDE 1 DROP: 10 SOLUTION/ DROPS OPHTHALMIC at 09:48

## 2019-06-18 ENCOUNTER — OFFICE VISIT (OUTPATIENT)
Dept: URGENT CARE | Facility: CLINIC | Age: 69
End: 2019-06-18
Payer: MEDICARE

## 2019-06-18 ENCOUNTER — APPOINTMENT (OUTPATIENT)
Dept: RADIOLOGY | Facility: CLINIC | Age: 69
End: 2019-06-18
Payer: MEDICARE

## 2019-06-18 VITALS
SYSTOLIC BLOOD PRESSURE: 137 MMHG | OXYGEN SATURATION: 96 % | RESPIRATION RATE: 16 BRPM | TEMPERATURE: 97.2 F | WEIGHT: 220 LBS | HEART RATE: 75 BPM | DIASTOLIC BLOOD PRESSURE: 85 MMHG | BODY MASS INDEX: 31.5 KG/M2 | HEIGHT: 70 IN

## 2019-06-18 DIAGNOSIS — M79.642 HAND PAIN, LEFT: ICD-10-CM

## 2019-06-18 DIAGNOSIS — M79.642 HAND PAIN, LEFT: Primary | ICD-10-CM

## 2019-06-18 PROCEDURE — 29125 APPL SHORT ARM SPLINT STATIC: CPT | Performed by: NURSE PRACTITIONER

## 2019-06-18 PROCEDURE — G0463 HOSPITAL OUTPT CLINIC VISIT: HCPCS | Performed by: NURSE PRACTITIONER

## 2019-06-18 PROCEDURE — 99213 OFFICE O/P EST LOW 20 MIN: CPT | Performed by: NURSE PRACTITIONER

## 2019-06-18 PROCEDURE — 73130 X-RAY EXAM OF HAND: CPT

## 2019-09-30 ENCOUNTER — HOSPITAL ENCOUNTER (INPATIENT)
Facility: HOSPITAL | Age: 69
LOS: 1 days | Discharge: HOME | DRG: 641 | End: 2019-10-02
Attending: EMERGENCY MEDICINE | Admitting: INTERNAL MEDICINE
Payer: MEDICARE

## 2019-09-30 ENCOUNTER — APPOINTMENT (EMERGENCY)
Dept: RADIOLOGY | Facility: HOSPITAL | Age: 69
DRG: 641 | End: 2019-09-30
Payer: MEDICARE

## 2019-09-30 DIAGNOSIS — R42 LIGHTHEADEDNESS: ICD-10-CM

## 2019-09-30 DIAGNOSIS — R55 NEAR SYNCOPE: Primary | ICD-10-CM

## 2019-09-30 DIAGNOSIS — I50.32 CHRONIC DIASTOLIC HEART FAILURE (HCC): ICD-10-CM

## 2019-09-30 LAB
ANION GAP SERPL CALCULATED.3IONS-SCNC: 7 MMOL/L (ref 4–13)
ANISOCYTOSIS BLD QL SMEAR: PRESENT
BASOPHILS # BLD MANUAL: 0 THOUSAND/UL (ref 0–0.1)
BASOPHILS NFR MAR MANUAL: 0 % (ref 0–1)
BUN SERPL-MCNC: 17 MG/DL (ref 5–25)
CALCIUM SERPL-MCNC: 8 MG/DL (ref 8.3–10.1)
CHLORIDE SERPL-SCNC: 111 MMOL/L (ref 100–108)
CO2 SERPL-SCNC: 23 MMOL/L (ref 21–32)
CREAT SERPL-MCNC: 1.11 MG/DL (ref 0.6–1.3)
EOSINOPHIL # BLD MANUAL: 0.11 THOUSAND/UL (ref 0–0.4)
EOSINOPHIL NFR BLD MANUAL: 1 % (ref 0–6)
ERYTHROCYTE [DISTWIDTH] IN BLOOD BY AUTOMATED COUNT: 12.7 % (ref 11.6–15.1)
GFR SERPL CREATININE-BSD FRML MDRD: 67 ML/MIN/1.73SQ M
GLUCOSE SERPL-MCNC: 74 MG/DL (ref 65–140)
HCT VFR BLD AUTO: 38.9 % (ref 36.5–49.3)
HGB BLD-MCNC: 13.6 G/DL (ref 12–17)
LYMPHOCYTES # BLD AUTO: 0.21 THOUSAND/UL (ref 0.6–4.47)
LYMPHOCYTES # BLD AUTO: 2 % (ref 14–44)
MAGNESIUM SERPL-MCNC: 1.6 MG/DL (ref 1.6–2.6)
MCH RBC QN AUTO: 32.8 PG (ref 26.8–34.3)
MCHC RBC AUTO-ENTMCNC: 35 G/DL (ref 31.4–37.4)
MCV RBC AUTO: 94 FL (ref 82–98)
MONOCYTES # BLD AUTO: 0.32 THOUSAND/UL (ref 0–1.22)
MONOCYTES NFR BLD: 3 % (ref 4–12)
NEUTROPHILS # BLD MANUAL: 10.07 THOUSAND/UL (ref 1.85–7.62)
NEUTS BAND NFR BLD MANUAL: 3 % (ref 0–8)
NEUTS SEG NFR BLD AUTO: 91 % (ref 43–75)
NRBC BLD AUTO-RTO: 0 /100 WBCS
PLATELET # BLD AUTO: 129 THOUSANDS/UL (ref 149–390)
PLATELET BLD QL SMEAR: ABNORMAL
PMV BLD AUTO: 10.2 FL (ref 8.9–12.7)
POTASSIUM SERPL-SCNC: 3.1 MMOL/L (ref 3.5–5.3)
RBC # BLD AUTO: 4.15 MILLION/UL (ref 3.88–5.62)
SODIUM SERPL-SCNC: 141 MMOL/L (ref 136–145)
TOTAL CELLS COUNTED SPEC: 100
TROPONIN I SERPL-MCNC: <0.02 NG/ML
WBC # BLD AUTO: 10.71 THOUSAND/UL (ref 4.31–10.16)

## 2019-09-30 PROCEDURE — 70450 CT HEAD/BRAIN W/O DYE: CPT

## 2019-09-30 PROCEDURE — 71046 X-RAY EXAM CHEST 2 VIEWS: CPT

## 2019-09-30 PROCEDURE — 85027 COMPLETE CBC AUTOMATED: CPT | Performed by: EMERGENCY MEDICINE

## 2019-09-30 PROCEDURE — 80048 BASIC METABOLIC PNL TOTAL CA: CPT | Performed by: EMERGENCY MEDICINE

## 2019-09-30 PROCEDURE — 99285 EMERGENCY DEPT VISIT HI MDM: CPT | Performed by: EMERGENCY MEDICINE

## 2019-09-30 PROCEDURE — 99285 EMERGENCY DEPT VISIT HI MDM: CPT

## 2019-09-30 PROCEDURE — 85007 BL SMEAR W/DIFF WBC COUNT: CPT | Performed by: EMERGENCY MEDICINE

## 2019-09-30 PROCEDURE — 84484 ASSAY OF TROPONIN QUANT: CPT | Performed by: EMERGENCY MEDICINE

## 2019-09-30 PROCEDURE — 93005 ELECTROCARDIOGRAM TRACING: CPT

## 2019-09-30 PROCEDURE — 1123F ACP DISCUSS/DSCN MKR DOCD: CPT | Performed by: INTERNAL MEDICINE

## 2019-09-30 PROCEDURE — 83735 ASSAY OF MAGNESIUM: CPT | Performed by: INTERNAL MEDICINE

## 2019-09-30 PROCEDURE — 36415 COLL VENOUS BLD VENIPUNCTURE: CPT | Performed by: EMERGENCY MEDICINE

## 2019-09-30 RX ORDER — ALBUTEROL SULFATE 90 UG/1
2 AEROSOL, METERED RESPIRATORY (INHALATION) EVERY 6 HOURS PRN
Status: DISCONTINUED | OUTPATIENT
Start: 2019-09-30 | End: 2019-10-02 | Stop reason: HOSPADM

## 2019-09-30 RX ORDER — LORATADINE 10 MG/1
10 TABLET ORAL DAILY
Status: DISCONTINUED | OUTPATIENT
Start: 2019-10-01 | End: 2019-10-02 | Stop reason: HOSPADM

## 2019-09-30 RX ORDER — IBUPROFEN 200 MG
TABLET ORAL EVERY 6 HOURS PRN
COMMUNITY
End: 2021-12-29

## 2019-09-30 RX ORDER — PANTOPRAZOLE SODIUM 40 MG/1
40 TABLET, DELAYED RELEASE ORAL
Status: DISCONTINUED | OUTPATIENT
Start: 2019-10-01 | End: 2019-10-02 | Stop reason: HOSPADM

## 2019-09-30 RX ORDER — POTASSIUM CHLORIDE 20 MEQ/1
40 TABLET, EXTENDED RELEASE ORAL ONCE
Status: COMPLETED | OUTPATIENT
Start: 2019-09-30 | End: 2019-09-30

## 2019-09-30 RX ORDER — LEVOTHYROXINE SODIUM 112 UG/1
112 TABLET ORAL
Status: DISCONTINUED | OUTPATIENT
Start: 2019-10-01 | End: 2019-10-02 | Stop reason: HOSPADM

## 2019-09-30 RX ORDER — MAGNESIUM SULFATE HEPTAHYDRATE 40 MG/ML
2 INJECTION, SOLUTION INTRAVENOUS ONCE
Status: COMPLETED | OUTPATIENT
Start: 2019-09-30 | End: 2019-10-01

## 2019-09-30 RX ORDER — CARVEDILOL 6.25 MG/1
6.25 TABLET ORAL DAILY
Status: DISCONTINUED | OUTPATIENT
Start: 2019-10-01 | End: 2019-09-30

## 2019-09-30 RX ORDER — CARVEDILOL 6.25 MG/1
6.25 TABLET ORAL DAILY
Status: DISCONTINUED | OUTPATIENT
Start: 2019-10-01 | End: 2019-10-01

## 2019-09-30 RX ORDER — BUDESONIDE AND FORMOTEROL FUMARATE DIHYDRATE 160; 4.5 UG/1; UG/1
2 AEROSOL RESPIRATORY (INHALATION) 2 TIMES DAILY
Status: DISCONTINUED | OUTPATIENT
Start: 2019-10-01 | End: 2019-10-02 | Stop reason: HOSPADM

## 2019-09-30 RX ORDER — CARVEDILOL 6.25 MG/1
6.25 TABLET ORAL DAILY
COMMUNITY
End: 2021-12-29

## 2019-09-30 RX ORDER — LISINOPRIL 20 MG/1
40 TABLET ORAL DAILY
Status: DISCONTINUED | OUTPATIENT
Start: 2019-10-01 | End: 2019-10-01

## 2019-09-30 RX ORDER — PRAVASTATIN SODIUM 40 MG
40 TABLET ORAL
Status: DISCONTINUED | OUTPATIENT
Start: 2019-10-01 | End: 2019-10-02 | Stop reason: HOSPADM

## 2019-09-30 RX ADMIN — POTASSIUM CHLORIDE 40 MEQ: 1500 TABLET, EXTENDED RELEASE ORAL at 22:26

## 2019-09-30 NOTE — ED ATTENDING ATTESTATION
9/30/2019  I, Suze Erwin MD, saw and evaluated the patient  I have discussed the patient with the resident/non-physician practitioner and agree with the resident's/non-physician practitioner's findings, Plan of Care, and MDM as documented in the resident's/non-physician practitioner's note, except where noted  All available labs and Radiology studies were reviewed  I was present for key portions of any procedure(s) performed by the resident/non-physician practitioner and I was immediately available to provide assistance  At this point I agree with the current assessment done in the Emergency Department    I have conducted an independent evaluation of this patient a history and physical is as follows:  Lightheaded while standing   At buffet   Did not pass out     Sat down drank water     2nd episode after  Sitting down and eating something    Denies  CP or   Positive forSOB     No room spinning   Initially however when EMS arrived and he was laid flat on the stretcher he did start having room spinning  No visual changes no difficulty speaking no focal weakness    H/o afib     Has  AICD  Has cad   No melena        On eliquis   patient has a history of cardiomyopathy nonischemic patient has a history of COPD and sleep apnea   Exam nad   perrl eomi no nystagmus  Tm ok neck no jvd  Supple  Lungs clear heart irr  abd soft nt nd pos bs ext normal neuro cn 212 intact motor intact  Cerebellar testing normal      impression near syncope vertigo and dyspnea    Cardiac workup chest x-ray  ED Course         Critical Care Time  Procedures

## 2019-09-30 NOTE — ED PROVIDER NOTES
History  Chief Complaint   Patient presents with    Dizziness     pt  was at [facility] and had a "dizzy spell" while walking around, denies chest pain, denies sob, weakness     79 yo male cc of dizzines  Patient states that he was standing at a buffet today when he had sudden onset episode of lightheadedness and where he felt [address]  Patient states that he sat down and felt better  Patient then had a 2nd episode shortly afterwards  He denies any loss of consciousness, denies any falls or head trauma or injury  He states that prior to the episodes he was feeling fine  He denied having chest pain or shortness of breath during or after the episode  Stephen Garza called and patient was brought to the emergency department, patient reports that while being transferred from stretcher to bed he felt like he was dizzy while he was laying flat and had some associated nausea, he got better while he was sitting up  He states that his symptoms have now resolved here in the ED  Dizziness   Quality:  Lightheadedness  Severity:  Moderate  Progression:  Resolved  Chronicity:  New  Context: standing up    Associated symptoms: nausea    Associated symptoms: no blood in stool, no chest pain, no diarrhea, no shortness of breath, no vomiting and no weakness        Prior to Admission Medications   Prescriptions Last Dose Informant Patient Reported? Taking?    COENZYME Q10-OMEGA 3 FATTY ACD PO  Self Yes No   Sig: Take 2,000 mg by mouth daily   ELIQUIS 5 MG  Self Yes No   Sig: Take 5 mg by mouth 2 (two) times a day    Flaxseed, Linseed, (FLAXSEED OIL) 1000 MG CAPS  Self Yes No   Sig: Take 2,000 mg by mouth daily   SYMBICORT 160-4 5 MCG/ACT inhaler  Self Yes No   Sig: Inhale 2 puffs 2 (two) times a day    albuterol (PROVENTIL HFA,VENTOLIN HFA) 90 mcg/act inhaler  Self Yes No   Sig: Inhale 2 puffs every 6 (six) hours as needed for wheezing   carvedilol (COREG) 6 25 mg tablet   Yes Yes   Sig: Take 6 25 mg by mouth daily   esomeprazole (NexIUM) 40 MG capsule  Self Yes No   Sig: Take 40 mg by mouth every morning before breakfast   ibuprofen (MOTRIN) 200 mg tablet   Yes Yes   Sig: Take by mouth every 6 (six) hours as needed for mild pain   levothyroxine 112 mcg tablet  Self Yes No   Sig: Take 112 mcg by mouth daily    lisinopril (ZESTRIL) 40 mg tablet  Self Yes No   Sig: Take 40 mg by mouth daily    loratadine (CLARITIN) 10 mg tablet  Self Yes No   Sig: Take 10 mg by mouth daily   metoprolol succinate (TOPROL-XL) 25 mg 24 hr tablet Not Taking at Unknown time Self Yes No   Si mg daily    simvastatin (ZOCOR) 20 mg tablet  Self Yes No   Sig: Take 20 mg by mouth daily at bedtime    travoprost (TRAVATAN-Z) 0 004 % ophthalmic solution  Self Yes No   Si drop daily at bedtime      Facility-Administered Medications: None       Past Medical History:   Diagnosis Date    Arthritis     Atrial fibrillation (HCC)     Chronic anticoagulation     Chronic kidney disease     Colon polyps     Coronary artery disease     CPAP (continuous positive airway pressure) dependence     bi pap    GERD (gastroesophageal reflux disease)     Hyperlipidemia     Hypertrophic cardiomegaly     Irregular heart beat     a fib    Pneumonia     Seasonal allergies     Sleep apnea        Past Surgical History:   Procedure Laterality Date    CARDIAC DEFIBRILLATOR PLACEMENT      CATARACT EXTRACTION      CATARACT EXTRACTION W/ INTRAOCULAR LENS IMPLANT Right 2019    Procedure: EXCHANGE OF IOL;  Surgeon: Faustino Bagley MD;  Location: [facility] MAIN OR;  Service: Ophthalmology    COLONOSCOPY      q 3 years   [facility] 16 / [facility] / [address] [place] [address] Right 3/26/2019    Procedure: PHACO W/IOL & LRI;  Surgeon: Faustino Bagley MD;  Location: [facility] MAIN OR;  Service: Ophthalmology     [facility] CATARACT EXTRACAP,INSERT LENS Left 2019    Procedure: DR LOW DELAROSA [facility] W/IOL & LRI;  Surgeon: Faustino Bagley MD;  Location: [facility] MAIN OR;  Service: Ophthalmology    TONSILLECTOMY         Family History   Problem Relation Age of Onset    Colon cancer Mother      I have reviewed and agree with the history as documented  Social History     Tobacco Use    Smoking status: Never Smoker    Smokeless tobacco: Never Used   Substance Use Topics    Alcohol use: Yes     Frequency: Monthly or less    Drug use: Never        Review of Systems   Constitutional: Negative for chills and fever  HENT: Negative for congestion and sore throat  Eyes: Negative for photophobia and visual disturbance  Respiratory: Negative for cough and shortness of breath  Cardiovascular: Negative for chest pain and leg swelling  Gastrointestinal: Positive for nausea  Negative for abdominal pain, blood in stool, diarrhea and vomiting  Genitourinary: Negative for dysuria and hematuria  Musculoskeletal: Negative for neck pain and neck stiffness  Skin: Negative for rash and wound  Neurological: Positive for dizziness  Negative for weakness and numbness  Psychiatric/Behavioral: Negative for behavioral problems and confusion  All other systems reviewed and are negative  Physical Exam  ED Triage Vitals [09/30/19 1829]   Temperature Pulse Respirations Blood Pressure SpO2   98 6 °F (37 °C) 86 18 140/71 98 %      Temp Source Heart Rate Source Patient Position - Orthostatic VS BP Location FiO2 (%)   Oral Monitor Sitting Right arm --      Pain Score       No Pain             Orthostatic Vital Signs  Vitals:    09/30/19 1829 09/30/19 2049   BP: 140/71 151/70   Pulse: 86 89   Patient Position - Orthostatic VS: Sitting Lying       Physical Exam   Constitutional: He is oriented to person, place, and time  He appears well-developed  No distress  HENT:   Head: Normocephalic and atraumatic     Right Ear: External ear normal    Left Ear: External ear normal    Nose: Nose normal    Mouth/Throat: Oropharynx is clear and moist    Eyes: Conjunctivae and EOM are normal  Right eye exhibits no discharge  Left eye exhibits no discharge  Neck: Normal range of motion  No tracheal deviation present  Cardiovascular: Normal rate, normal heart sounds and intact distal pulses  Pulmonary/Chest: Effort normal and breath sounds normal  No stridor  No respiratory distress  He has no wheezes  He has no rales  He exhibits no tenderness  Abdominal: Soft  Bowel sounds are normal  He exhibits no distension  There is no tenderness  There is no rebound and no guarding  Musculoskeletal: He exhibits no tenderness  Neurological: He is alert and oriented to person, place, and time  No cranial nerve deficit or sensory deficit  He exhibits normal muscle tone  CN II-XII intact, 5/5 motor and intact sensation in upper and lower extremities bilaterally, intact finger to nose coordination, no pronator drift     Skin: Skin is warm and dry  Capillary refill takes less than 2 seconds  No rash noted  He is not diaphoretic  Psychiatric: His behavior is normal    Nursing note and vitals reviewed  ED Medications  Medications   potassium chloride (K-DUR,KLOR-CON) CR tablet 40 mEq (40 mEq Oral Given 9/30/19 2226)       Diagnostic Studies  Results Reviewed     Procedure Component Value Units Date/Time    Magnesium [[id number]]  (Normal) Collected:  09/30/19 2015    Lab Status:  Final result Specimen:  Blood from Arm, Left Updated:  09/30/19 2226     Magnesium 1 6 mg/dL     CBC and differential [[id number]]  (Abnormal) Collected:  09/30/19 2015    Lab Status:  Final result Specimen:  Blood from Arm, Left Updated:  09/30/19 2208     WBC 10 71 Thousand/uL      RBC 4 15 Million/uL      Hemoglobin 13 6 g/dL      Hematocrit 38 9 %      MCV 94 fL      MCH 32 8 pg      MCHC 35 0 g/dL      RDW 12 7 %      MPV 10 2 fL      Platelets 328 Thousands/uL      nRBC 0 /100 WBCs     Narrative: This is an appended report  These results have been appended to a previously verified report      Troponin I [[id number]] (Normal) Collected:  09/30/19 2015    Lab Status:  Final result Specimen:  Blood from Arm, Left Updated:  09/30/19 2054     Troponin I <0 02 ng/mL     Basic metabolic panel [[id number]]  (Abnormal) Collected:  09/30/19 2015    Lab Status:  Final result Specimen:  Blood from Arm, Left Updated:  09/30/19 2048     Sodium 141 mmol/L      Potassium 3 1 mmol/L      Chloride 111 mmol/L      CO2 23 mmol/L      ANION GAP 7 mmol/L      BUN 17 mg/dL      Creatinine 1 11 mg/dL      Glucose 74 mg/dL      Calcium 8 0 mg/dL      eGFR 67 ml/min/1 73sq m     Narrative:       [place] guidelines for Chronic Kidney Disease (CKD):     Stage 1 with normal or high GFR (GFR > 90 mL/min/1 73 square meters)    Stage 2 Mild CKD (GFR = 60-89 mL/min/1 73 square meters)    Stage 3A Moderate CKD (GFR = 45-59 mL/min/1 73 square meters)    Stage 3B Moderate CKD (GFR = 30-44 mL/min/1 73 square meters)    Stage 4 Severe CKD (GFR = 15-29 mL/min/1 73 square meters)    Stage 5 End Stage CKD (GFR <15 mL/min/1 73 square meters)  Note: GFR calculation is accurate only with a steady state creatinine                 CT head without contrast   Final Result by Cintia Cyr MD (09/30 2039)      No acute intracranial abnormality                    Workstation performed: [id number]         XR chest 2 views    (Results Pending)         Procedures  Procedures        ED Course  ED Course as of Sep 30 2228   Mon Sep 30, 2019   2025 Procedure Note: EKG  Date/Time: 09/30/19 8:25 PM   Interpreted by: Nas Cruz   Indications / Diagnosis: dizziness  ECG reviewed by me, the ED Provider: yes   The EKG demonstrates:  Rhythm: normal sinus  Intervals: normal intervals  Axis: normal axis  QRS/Blocks: normal QRS  ST Changes: diffuse ST changes in inferior and precordial leads, no CAM/STD, no prior ecg for comparison          2103 Troponin I: <0 02   2103 Creatinine: 1 11           Identification of Seniors at Risk      Most Recent Value   (ISAR) Identification of Seniors at Risk   Before the illness or injury that brought you to the Emergency, did you need someone to help you on a regular basis? 0 Filed at: 09/30/2019 1829   In the last 24 hours, have you needed more help than usual?  0 Filed at: 09/30/2019 1829   Have you been hospitalized for one or more nights during the past 6 months? 0 Filed at: 09/30/2019 1829   In general, do you see well?  0 Filed at: 09/30/2019 1829   In general, do you have serious problems with your memory? 0 Filed at: 09/30/2019 1829   Do you take more than three different medications every day? 1 Filed at: 09/30/2019 1829   ISAR Score  1 Filed at: 09/30/2019 1829                          [facility]  Number of Diagnoses or Management Options  Diagnosis management comments: This is a 71-year-old male who presents with multiple episodes of dizziness and lightheadedness starting this afternoon, per patient's descriptive he denies room spinning however, he does endorse a sensation of dizziness while he was laying flat and became nauseated which is suggestive of possible vertigo  Patient asymptomatic at time of eval in ED, final stable  Will obtain cardiac workup including CBC, BMP, troponin, chest x-ray, EKG  Will also obtain a noncontrast CT head  Disposition  Final diagnoses:   Near syncope   Lightheadedness     Time reflects when diagnosis was documented in both MDM as applicable and the Disposition within this note     Time User Action Codes Description Comment    9/30/2019 10:27 PM Elena Diana Add [R55] Near syncope     9/30/2019 10:28 PM Elena Diana Add [R42] Lightheadedness       ED Disposition     ED Disposition Condition Date/Time Comment    Admit Stable Mon Sep 30, 2019 10:27 PM Case was discussed with Dr Braydon Wyatt and the patient's admission status was agreed to be Admission Status: observation status to the service of Dr Braydon Wyatt           Follow-up Information    None         Patient's Medications   Discharge Prescriptions    No medications on file     No discharge procedures on file  ED Provider  Attending physically available and evaluated Wilder Tirado I managed the patient along with the ED Attending      Electronically Signed by         Mumtaz Merino MD  09/30/19 2959

## 2019-10-01 ENCOUNTER — APPOINTMENT (INPATIENT)
Dept: RADIOLOGY | Facility: HOSPITAL | Age: 69
DRG: 641 | End: 2019-10-01
Payer: MEDICARE

## 2019-10-01 PROBLEM — D69.6 THROMBOCYTOPENIA (HCC): Status: ACTIVE | Noted: 2019-10-01

## 2019-10-01 PROBLEM — I48.0 PAROXYSMAL ATRIAL FIBRILLATION (HCC): Status: ACTIVE | Noted: 2019-02-11

## 2019-10-01 PROBLEM — N17.9 AKI (ACUTE KIDNEY INJURY) (HCC): Status: ACTIVE | Noted: 2019-10-01

## 2019-10-01 PROBLEM — N18.30 ACUTE RENAL FAILURE SUPERIMPOSED ON STAGE 3 CHRONIC KIDNEY DISEASE (HCC): Status: ACTIVE | Noted: 2019-10-01

## 2019-10-01 PROBLEM — I49.5 SICK SINUS SYNDROME (HCC): Status: ACTIVE | Noted: 2018-01-22

## 2019-10-01 PROBLEM — R55 PRE-SYNCOPE: Status: ACTIVE | Noted: 2019-10-01

## 2019-10-01 PROBLEM — E87.6 HYPOKALEMIA: Status: ACTIVE | Noted: 2019-10-01

## 2019-10-01 PROBLEM — E87.6 HYPOKALEMIA: Status: RESOLVED | Noted: 2019-10-01 | Resolved: 2019-10-01

## 2019-10-01 PROBLEM — N17.9 AKI (ACUTE KIDNEY INJURY) (HCC): Status: RESOLVED | Noted: 2019-10-01 | Resolved: 2019-10-01

## 2019-10-01 LAB
ANION GAP SERPL CALCULATED.3IONS-SCNC: 5 MMOL/L (ref 4–13)
ANION GAP SERPL CALCULATED.3IONS-SCNC: 7 MMOL/L (ref 4–13)
ATRIAL RATE: 75 BPM
ATRIAL RATE: 80 BPM
ATRIAL RATE: 80 BPM
BILIRUB UR QL STRIP: NEGATIVE
BUN SERPL-MCNC: 22 MG/DL (ref 5–25)
BUN SERPL-MCNC: 23 MG/DL (ref 5–25)
CALCIUM SERPL-MCNC: 8.9 MG/DL (ref 8.3–10.1)
CALCIUM SERPL-MCNC: 8.9 MG/DL (ref 8.3–10.1)
CHLORIDE SERPL-SCNC: 106 MMOL/L (ref 100–108)
CHLORIDE SERPL-SCNC: 106 MMOL/L (ref 100–108)
CLARITY UR: CLEAR
CO2 SERPL-SCNC: 25 MMOL/L (ref 21–32)
CO2 SERPL-SCNC: 26 MMOL/L (ref 21–32)
COLOR UR: YELLOW
CREAT SERPL-MCNC: 1.69 MG/DL (ref 0.6–1.3)
CREAT SERPL-MCNC: 1.82 MG/DL (ref 0.6–1.3)
GFR SERPL CREATININE-BSD FRML MDRD: 37 ML/MIN/1.73SQ M
GFR SERPL CREATININE-BSD FRML MDRD: 40 ML/MIN/1.73SQ M
GLUCOSE SERPL-MCNC: 102 MG/DL (ref 65–140)
GLUCOSE SERPL-MCNC: 106 MG/DL (ref 65–140)
GLUCOSE UR STRIP-MCNC: NEGATIVE MG/DL
HGB UR QL STRIP.AUTO: NEGATIVE
KETONES UR STRIP-MCNC: NEGATIVE MG/DL
LEUKOCYTE ESTERASE UR QL STRIP: NEGATIVE
MAGNESIUM SERPL-MCNC: 2.8 MG/DL (ref 1.6–2.6)
NITRITE UR QL STRIP: NEGATIVE
P AXIS: -9 DEGREES
P AXIS: 47 DEGREES
P AXIS: 84 DEGREES
PH UR STRIP.AUTO: 7 [PH]
POTASSIUM SERPL-SCNC: 4 MMOL/L (ref 3.5–5.3)
POTASSIUM SERPL-SCNC: 4.1 MMOL/L (ref 3.5–5.3)
PR INTERVAL: 228 MS
PR INTERVAL: 234 MS
PR INTERVAL: 240 MS
PROT UR STRIP-MCNC: NEGATIVE MG/DL
QRS AXIS: -11 DEGREES
QRS AXIS: -15 DEGREES
QRS AXIS: -4 DEGREES
QRSD INTERVAL: 100 MS
QRSD INTERVAL: 102 MS
QRSD INTERVAL: 98 MS
QT INTERVAL: 342 MS
QT INTERVAL: 350 MS
QT INTERVAL: 396 MS
QTC INTERVAL: 394 MS
QTC INTERVAL: 403 MS
QTC INTERVAL: 442 MS
SODIUM SERPL-SCNC: 137 MMOL/L (ref 136–145)
SODIUM SERPL-SCNC: 138 MMOL/L (ref 136–145)
SP GR UR STRIP.AUTO: 1 (ref 1–1.03)
T WAVE AXIS: 131 DEGREES
T WAVE AXIS: 176 DEGREES
T WAVE AXIS: 179 DEGREES
TROPONIN I SERPL-MCNC: <0.02 NG/ML
TROPONIN I SERPL-MCNC: <0.02 NG/ML
TSH SERPL DL<=0.05 MIU/L-ACNC: 0.78 UIU/ML (ref 0.36–3.74)
UROBILINOGEN UR QL STRIP.AUTO: 1 E.U./DL
VENTRICULAR RATE: 75 BPM
VENTRICULAR RATE: 80 BPM
VENTRICULAR RATE: 80 BPM

## 2019-10-01 PROCEDURE — 81003 URINALYSIS AUTO W/O SCOPE: CPT | Performed by: NURSE PRACTITIONER

## 2019-10-01 PROCEDURE — 76770 US EXAM ABDO BACK WALL COMP: CPT

## 2019-10-01 PROCEDURE — 84484 ASSAY OF TROPONIN QUANT: CPT | Performed by: INTERNAL MEDICINE

## 2019-10-01 PROCEDURE — 84443 ASSAY THYROID STIM HORMONE: CPT | Performed by: PHYSICIAN ASSISTANT

## 2019-10-01 PROCEDURE — 99223 1ST HOSP IP/OBS HIGH 75: CPT | Performed by: INTERNAL MEDICINE

## 2019-10-01 PROCEDURE — 83735 ASSAY OF MAGNESIUM: CPT | Performed by: PHYSICIAN ASSISTANT

## 2019-10-01 PROCEDURE — 80048 BASIC METABOLIC PNL TOTAL CA: CPT | Performed by: NURSE PRACTITIONER

## 2019-10-01 PROCEDURE — 99232 SBSQ HOSP IP/OBS MODERATE 35: CPT | Performed by: NURSE PRACTITIONER

## 2019-10-01 PROCEDURE — 99222 1ST HOSP IP/OBS MODERATE 55: CPT | Performed by: INTERNAL MEDICINE

## 2019-10-01 PROCEDURE — 94760 N-INVAS EAR/PLS OXIMETRY 1: CPT

## 2019-10-01 PROCEDURE — 80048 BASIC METABOLIC PNL TOTAL CA: CPT | Performed by: PHYSICIAN ASSISTANT

## 2019-10-01 PROCEDURE — 93010 ELECTROCARDIOGRAM REPORT: CPT | Performed by: INTERNAL MEDICINE

## 2019-10-01 PROCEDURE — 93005 ELECTROCARDIOGRAM TRACING: CPT

## 2019-10-01 PROCEDURE — 94660 CPAP INITIATION&MGMT: CPT

## 2019-10-01 RX ORDER — SODIUM CHLORIDE 9 MG/ML
75 INJECTION, SOLUTION INTRAVENOUS CONTINUOUS
Status: DISCONTINUED | OUTPATIENT
Start: 2019-10-01 | End: 2019-10-02 | Stop reason: HOSPADM

## 2019-10-01 RX ORDER — CARVEDILOL 6.25 MG/1
6.25 TABLET ORAL DAILY
Status: DISCONTINUED | OUTPATIENT
Start: 2019-10-02 | End: 2019-10-01

## 2019-10-01 RX ORDER — CARVEDILOL 6.25 MG/1
6.25 TABLET ORAL 2 TIMES DAILY WITH MEALS
Status: DISCONTINUED | OUTPATIENT
Start: 2019-10-01 | End: 2019-10-02 | Stop reason: HOSPADM

## 2019-10-01 RX ORDER — ACETAMINOPHEN 325 MG/1
650 TABLET ORAL EVERY 6 HOURS PRN
Status: DISCONTINUED | OUTPATIENT
Start: 2019-10-01 | End: 2019-10-02 | Stop reason: HOSPADM

## 2019-10-01 RX ADMIN — BUDESONIDE AND FORMOTEROL FUMARATE DIHYDRATE 2 PUFF: 160; 4.5 AEROSOL RESPIRATORY (INHALATION) at 08:38

## 2019-10-01 RX ADMIN — CARVEDILOL 6.25 MG: 6.25 TABLET, FILM COATED ORAL at 00:40

## 2019-10-01 RX ADMIN — SODIUM CHLORIDE 75 ML/HR: 0.9 INJECTION, SOLUTION INTRAVENOUS at 22:31

## 2019-10-01 RX ADMIN — PRAVASTATIN SODIUM 40 MG: 40 TABLET ORAL at 22:26

## 2019-10-01 RX ADMIN — APIXABAN 5 MG: 5 TABLET, FILM COATED ORAL at 08:38

## 2019-10-01 RX ADMIN — CARVEDILOL 6.25 MG: 6.25 TABLET, FILM COATED ORAL at 08:38

## 2019-10-01 RX ADMIN — MAGNESIUM SULFATE HEPTAHYDRATE 2 G: 40 INJECTION, SOLUTION INTRAVENOUS at 01:21

## 2019-10-01 RX ADMIN — ACETAMINOPHEN 650 MG: 325 TABLET ORAL at 22:29

## 2019-10-01 RX ADMIN — LISINOPRIL 40 MG: 20 TABLET ORAL at 08:38

## 2019-10-01 RX ADMIN — CARVEDILOL 6.25 MG: 6.25 TABLET, FILM COATED ORAL at 23:09

## 2019-10-01 RX ADMIN — APIXABAN 5 MG: 5 TABLET, FILM COATED ORAL at 00:40

## 2019-10-01 RX ADMIN — PRAVASTATIN SODIUM 40 MG: 40 TABLET ORAL at 00:40

## 2019-10-01 RX ADMIN — APIXABAN 5 MG: 5 TABLET, FILM COATED ORAL at 17:06

## 2019-10-01 RX ADMIN — SODIUM CHLORIDE 75 ML/HR: 0.9 INJECTION, SOLUTION INTRAVENOUS at 10:08

## 2019-10-01 RX ADMIN — BUDESONIDE AND FORMOTEROL FUMARATE DIHYDRATE 2 PUFF: 160; 4.5 AEROSOL RESPIRATORY (INHALATION) at 17:06

## 2019-10-01 RX ADMIN — LORATADINE 10 MG: 10 TABLET ORAL at 08:38

## 2019-10-01 RX ADMIN — PANTOPRAZOLE SODIUM 40 MG: 40 TABLET, DELAYED RELEASE ORAL at 05:59

## 2019-10-01 RX ADMIN — LEVOTHYROXINE SODIUM 112 MCG: 112 TABLET ORAL at 05:59

## 2019-10-01 NOTE — ASSESSMENT & PLAN NOTE
· Presenting from [facility] with several episodes of dizziness with apparent reported near syncope  · Has been asymptomatic since admission  · EKG nonischemic but with multiple T-wave inversions; no prior EKGs available for comparison      · Trop x3 negative   · Additionally ICD interrogation significant for 8 episodes of NSVT which did not trigger shock  · Patient recently switched off metoprolol to Coreg approximately 10 days prior to this admission  · In setting of extensive cardiac history admitted for further evaluation of above  · Telemetry monitoring, check orthostatics  · Consult cardiology d/t significant cardiac history

## 2019-10-01 NOTE — ASSESSMENT & PLAN NOTE
· K 3 1 on admission and repleted in the ED  Will recheck with a m  Labs  · Additionally magnesium 1 6 and will replete, recheck with a m   Labs

## 2019-10-01 NOTE — ASSESSMENT & PLAN NOTE
Wt Readings from Last 3 Encounters:   10/01/19 102 kg (224 lb 9 6 oz)   09/05/19 104 kg (230 lb)   06/18/19 99 8 kg (220 lb)     · Appears euvolemic to examination  · Continue medications for this, and monitor daily weights

## 2019-10-01 NOTE — H&P
H&P Exam - Bety Bowels Flerx 78 y o  male MRN: [id number]    Unit/Bed#: CW2 211-01 Encounter: [id number]      Assessment/Plan   1  Pre-syncopal episode  -CT head w/o contrast negative    -Orthostatic BP's negative this admission    -Troponins negative  -AICD interrogation in the ED, showed NSVT episode  -Appreciate cardiology consult, history of HOCM, NSVT episodes--want to rule out cardiac component   -Continue telemetry monitoring, NSR     2  Apical Variant hypertrophic cardiomyopathy  -Per [facility] Care Everywhere, ECHO 8/2/19 shows EF 65%: Normal left ventricular size  Normal left ventricular systolic function  Increased apical wall thickness consistent with apical hypertrophic cardiomyopathy  Normal regional wall motion    -Follows with Dr Anh Cardenas from [facility]  Saw in the office 9/2019--patient has been taking Coreg as instructed, Metoprolol stopped  Was instructed by Dr Anh Cardenas to get cardiac MRI in 6 months    -Continue Coreg, Lisinopril   -Recheck ECHO     3  Hypokalemia   -K 3 1 on admission--repleted with 40 K Dur---K 4 0 this AM     4  LAKISHA 2/2 dehydration  -Creat 1 11 on admission-> creat 1 69 this AM  -Patient reports he only had a coffee in the morning and a turkey sandwich at 2300    -Will start gentle IVF NS @ 75cc/hr and recheck BMP this afternoon    -Avoid nephrotoxins, hypotension    5  Chronic diastolic heart failure  -Patient euvolemic on exam, appears compensated    -Continue daily weights  -Cardiac diet    7  Atrial fibrillation  -Continue Eliquis  -NSR on telemetry    8  Hypothyroidism  -TSH this admission 0 780  -Continue Synthroid     9  JUANI  -compliant with CPAP machine, continue inpatient    10  Sick sinus syndrome   -s/p AICD, stable  -Continue telemetry    11  Asthma  -Continue Symbicort, Claritin, PRN Ventolin inhaler    12  HLD  -Continue statin     History of Present Illness   HPI:  Jluis Wilkerson is a 78 y o  male who presents with syncopal episode while at the [facility]   Patient reports he only had 1 coffee for breakfast and was standing in line at the buffet line when he felt dizzy, requiring him to sit down  Patient reports no relief with rest/sitting in chair  When he went to stand from chair, he felt off balance  Patient [place], felt some relief sitting upright  Patient recalls he had an episode of vertigo/BPPV three years ago in which the symptoms were similar, "I had crystals in my ear"  Patient denies any other episodes of vertigo/dizziness up until the episode yesterday  Patient reports he had a cardiac catheterization four years ago with clean coronaries  Patient follows with [facility] cardiology, was seen in the office Sept 2019, was taken off Metoprolol and placed on Coreg  Patient reports he has been compliant with all of his medications and CPAP machine  PCP: Yessica Garcias MD    Review of Systems   Constitutional: Negative  HENT: Negative  Eyes: Negative  Respiratory: Negative  Cardiovascular: Negative  Gastrointestinal: Negative  Genitourinary: Negative  Musculoskeletal: Negative  Skin: Negative  Neurological: Positive for dizziness and light-headedness  Hematological: Negative  Psychiatric/Behavioral: Negative          Historical Information   Past Medical History:   Diagnosis Date    Arthritis     Atrial fibrillation (HCC)     Chronic anticoagulation     Chronic kidney disease     Colon polyps     Coronary artery disease     CPAP (continuous positive airway pressure) dependence     bi pap    GERD (gastroesophageal reflux disease)     Hyperlipidemia     Hypertrophic cardiomegaly     Irregular heart beat     a fib    Pneumonia     Seasonal allergies     Sleep apnea      Past Surgical History:   Procedure Laterality Date    CARDIAC DEFIBRILLATOR PLACEMENT  2018    CATARACT EXTRACTION  2019    CATARACT EXTRACTION W/ INTRAOCULAR LENS IMPLANT Right 4/9/2019    Procedure: EXCHANGE OF IOL;  Surgeon: Bess Yepez MD;  Location: [address] [place] [address] OR;  Service: Ophthalmology    COLONOSCOPY      q 3 years   Alejandra Paulo FRACTURE SURGERY  1969    Vee Rocha / Julio Carbajal / Georgette Overton Right 3/26/2019    Procedure: DR LOW DELAROSA [facility] W/IOL & LRI;  Surgeon: Bess Yepez MD;  Location: [address] [place] [address] OR;  Service: Ophthalmology     [address] CATARACT EXTRACAP,INSERT LENS Left 5/7/2019    Procedure: DR LOW DELAROSA [facility] W/IOL & LRI;  Surgeon: Bess Yepez MD;  Location: [address] [place] [address] OR;  Service: Ophthalmology    TONSILLECTOMY       Social History   Social History     Substance and Sexual Activity   Alcohol Use Yes    Frequency: Monthly or less     Social History     Substance and Sexual Activity   Drug Use Never     Social History     Tobacco Use   Smoking Status Never Smoker   Smokeless Tobacco Never Used     Family History: non-contributory    Meds/Allergies   PTA meds:   Prior to Admission Medications   Prescriptions Last Dose Informant Patient Reported? Taking?    COENZYME Q10-OMEGA 3 FATTY ACD PO 9/30/2019 at Unknown time Self Yes Yes   Sig: Take 2,000 mg by mouth daily   ELIQUIS 5 MG 9/30/2019 at Unknown time Self Yes Yes   Sig: Take 5 mg by mouth 2 (two) times a day    Flaxseed, Linseed, (FLAXSEED OIL) 1000 MG CAPS 9/30/2019 at Unknown time Self Yes Yes   Sig: Take 2,000 mg by mouth daily   SYMBICORT 160-4 5 MCG/ACT inhaler 9/30/2019 at Unknown time Self Yes Yes   Sig: Inhale 2 puffs 2 (two) times a day    albuterol (PROVENTIL HFA,VENTOLIN HFA) 90 mcg/act inhaler  Self Yes No   Sig: Inhale 2 puffs every 6 (six) hours as needed for wheezing   carvedilol (COREG) 6 25 mg tablet 9/30/2019 at Unknown time  Yes Yes   Sig: Take 6 25 mg by mouth daily   esomeprazole (NexIUM) 40 MG capsule 9/30/2019 at Unknown time Self Yes Yes   Sig: Take 40 mg by mouth every morning before breakfast   ibuprofen (MOTRIN) 200 mg tablet Past Week at Unknown time  Yes Yes   Sig: Take by mouth every 6 (six) hours as needed for mild pain   levothyroxine 112 mcg tablet 2019 at Unknown time Self Yes Yes   Sig: Take 112 mcg by mouth daily    lisinopril (ZESTRIL) 40 mg tablet 2019 at Unknown time Self Yes Yes   Sig: Take 40 mg by mouth daily    loratadine (CLARITIN) 10 mg tablet 2019 at Unknown time Self Yes Yes   Sig: Take 10 mg by mouth daily   metoprolol succinate (TOPROL-XL) 25 mg 24 hr tablet Not Taking at Unknown time Self Yes No   Si mg daily    simvastatin (ZOCOR) 20 mg tablet 2019 at Unknown time Self Yes Yes   Sig: Take 20 mg by mouth daily at bedtime    travoprost (TRAVATAN-Z) 0 004 % ophthalmic solution  Self Yes No   Si drop daily at bedtime      Facility-Administered Medications: None     Allergies   Allergen Reactions    Procardia [Nifedipine] Palpitations       Objective   Vitals: Blood pressure 114/67, pulse 83, temperature 99 3 °F (37 4 °C), resp  rate 21, height 5' 10" (1 778 m), weight 102 kg (224 lb 9 6 oz), SpO2 96 %  Intake/Output Summary (Last 24 hours) at 10/1/2019 0844  Last data filed at 10/1/2019 0101  Gross per 24 hour   Intake 400 ml   Output 250 ml   Net 150 ml       Invasive Devices     Peripheral Intravenous Line            Peripheral IV 19 147 days    Peripheral IV 19 Left Antecubital less than 1 day                Physical Exam   Constitutional: He is oriented to person, place, and time  He appears well-developed and well-nourished  No distress  HENT:   Head: Normocephalic and atraumatic  Eyes: Pupils are equal, round, and reactive to light  Conjunctivae and EOM are normal    Neck: Normal range of motion  Neck supple  No JVD present  Cardiovascular: Normal heart sounds and intact distal pulses  Pulmonary/Chest: Effort normal and breath sounds normal    CTA   Abdominal: Soft  Bowel sounds are normal    Musculoskeletal: Normal range of motion  Neurological: He is alert and oriented to person, place, and time  Skin: Skin is warm and dry     Psychiatric: He has a normal mood and affect  Lab Results: I have personally reviewed pertinent reports  Imaging: I have personally reviewed pertinent reports  EKG, Pathology, and Other Studies: I have personally reviewed pertinent reports  Telemetry: NSR     Code Status: Level 3 - DNAR and DNI  Advance Directive and Living Will:      Power of :    POLST:      Counseling / Coordination of Care  Total floor / unit time spent today 30 minutes  Greater than 50% of total time was spent with the patient and / or family counseling and / or coordination of care

## 2019-10-01 NOTE — SOCIAL WORK
CM discussed pt at care coordination rounds,  CM met w/ pt for d/c planning  Pt verbalizes understanding of CM role,  PTA pt lives w/ wife in ranch style home w/ 2 steps to enter  Bed & bath on main level  pt is independent w/ all ADLS & Ambulation without assistive device  Pt has access to cane, rolling walker & wheelchair  Pt uses [facility] [address] in [place] for pharmacy needs  PCP Dr Manfred Pollard  POA/Emergency contact wife Colt Cevallos [phone]  Pt has had no admission history of short term rehab, psych or D & A  Colt Cevallos will drive home  No d/c needs at this time  CM reviewed d/c planning process including the following: identifying help at home, patient preference for d/c planning needs, Discharge Lounge, [facility] Meds to Bed program, availability of treatment team to discuss questions or concerns patient and/or family may have regarding understanding medications and recognizing signs and symptoms once discharged  CM also encouraged patient to follow up with all recommended appointments after discharge  Patient advised of importance for patient and family to participate in managing patients medical well being    Discharge checklist discussed with patient

## 2019-10-01 NOTE — PLAN OF CARE
Problem: Potential for Falls  Goal: Patient will remain free of falls  Description  INTERVENTIONS:  - Assess patient frequently for physical needs  -  Identify cognitive and physical deficits and behaviors that affect risk of falls    -  [place] fall precautions as indicated by assessment   - Educate patient/family on patient safety including physical limitations  - Instruct patient to call for assistance with activity based on assessment  - Modify environment to reduce risk of injury  - Consider OT/PT consult to assist with strengthening/mobility  Outcome: Progressing     Problem: PAIN - ADULT  Goal: Verbalizes/displays adequate comfort level or baseline comfort level  Description  Interventions:  - Encourage patient to monitor pain and request assistance  - Assess pain using appropriate pain scale  - Administer analgesics based on type and severity of pain and evaluate response  - Implement non-pharmacological measures as appropriate and evaluate response  - Consider cultural and social influences on pain and pain management  - Notify physician/advanced practitioner if interventions unsuccessful or patient reports new pain  Outcome: Progressing     Problem: INFECTION - ADULT  Goal: Absence or prevention of progression during hospitalization  Description  INTERVENTIONS:  - Assess and monitor for signs and symptoms of infection  - Monitor lab/diagnostic results  - Monitor all insertion sites, i e  indwelling lines, tubes, and drains  - Monitor endotracheal if appropriate and nasal secretions for changes in amount and color  - [place] appropriate cooling/warming therapies per order  - Administer medications as ordered  - Instruct and encourage patient and family to use good hand hygiene technique  - Identify and instruct in appropriate isolation precautions for identified infection/condition  Outcome: Progressing  Goal: Absence of fever/infection during neutropenic period  Description  INTERVENTIONS:  - Monitor WBC    Outcome: Progressing     Problem: SAFETY ADULT  Goal: Maintain or return to baseline ADL function  Description  INTERVENTIONS:  -  Assess patient's ability to carry out ADLs; assess patient's baseline for ADL function and identify physical deficits which impact ability to perform ADLs (bathing, care of mouth/teeth, toileting, grooming, dressing, etc )  - Assess/evaluate cause of self-care deficits   - Assess range of motion  - Assess patient's mobility; develop plan if impaired  - Assess patient's need for assistive devices and provide as appropriate  - Encourage maximum independence but intervene and supervise when necessary  - Involve family in performance of ADLs  - Assess for home care needs following discharge   - Consider OT consult to assist with ADL evaluation and planning for discharge  - Provide patient education as appropriate  Outcome: Progressing  Goal: Maintain or return mobility status to optimal level  Description  INTERVENTIONS:  - Assess patient's baseline mobility status (ambulation, transfers, stairs, etc )    - Identify cognitive and physical deficits and behaviors that affect mobility  - Identify mobility aids required to assist with transfers and/or ambulation (gait belt, sit-to-stand, lift, walker, cane, etc )  - [place] fall precautions as indicated by assessment  - Record patient progress and toleration of activity level on Mobility SBAR; progress patient to next Phase/Stage  - Instruct patient to call for assistance with activity based on assessment  - Consider rehabilitation consult to assist with strengthening/weightbearing, etc   Outcome: Progressing     Problem: DISCHARGE PLANNING  Goal: Discharge to home or other facility with appropriate resources  Description  INTERVENTIONS:  - Identify barriers to discharge w/patient and caregiver  - Arrange for needed discharge resources and transportation as appropriate  - Identify discharge learning needs (meds, wound care, etc )  - Arrange for interpretive services to assist at discharge as needed  - Refer to Case Management Department for coordinating discharge planning if the patient needs post-hospital services based on physician/advanced practitioner order or complex needs related to functional status, cognitive ability, or social support system  Outcome: Progressing     Problem: Knowledge Deficit  Goal: Patient/family/caregiver demonstrates understanding of disease process, treatment plan, medications, and discharge instructions  Description  Complete learning assessment and assess knowledge base    Interventions:  - Provide teaching at level of understanding  - Provide teaching via preferred learning methods  Outcome: Progressing

## 2019-10-01 NOTE — ASSESSMENT & PLAN NOTE
· Creat 1 69 this am   · Baseline appears to be 1 1-1 3  · Likely prerenal secondary to dehydration  · Hold lisinopril (did receive am dose today)  · Check bladder scan   · Start normal saline at 75ml/hr   · Avoid nephrotoxic drugs and hypotension

## 2019-10-01 NOTE — PHYSICIAN ADVISOR
Current patient class: Observation  The patient is currently on Hospital Day: 2 at [address] [place] [address]      This patient was originally admitted to the hospital under observation status  After admission the patient was reevaluated and determined to require further hospitalization  The patient is now documented to require at least a 2nd midnight in the hospital  As such the patient is now expected to satisfy the 2 midnight benchmark and is therefore eligible for inpatient admission  After review of the relevant documentation, labs, vital signs and test results, the patient is appropriate for INPATIENT ADMISSION  Admission to the hospital as an inpatient is a complex decision making process which requires the practitioner to consider the patients presenting complaint, history and physical examination and all relevant testing  With this in mind, in this case, the patient was deemed appropriate for INPATIENT ADMISSION  After review of the documentation and testing available at the time of the admission I concur with this clinical determination of medical necessity  Rationale is as follows: The patient presented with near-syncope  EKG showed multiple T-wave inversions, no comparison  ICD interrogation was notable for eight episodes of NSVT which did not trigger shock  He also has acute kidney injury, pre renal   Intravenous fluid will be started  The patient is not yet ready for discharge and has become appropriate for change to inpatient class  The patient will be evaluated by Cardiology also  I will contact the provider      The patients vitals on arrival were ED Triage Vitals [09/30/19 1829]   Temperature Pulse Respirations Blood Pressure SpO2   98 6 °F (37 °C) 86 18 140/71 98 %      Temp Source Heart Rate Source Patient Position - Orthostatic VS BP Location FiO2 (%)   Oral Monitor Sitting Right arm --      Pain Score       No Pain           Past Medical History: Diagnosis Date    LAKISHA (acute kidney injury) ([facility] [address] ) 10/1/2019    Arthritis     Atrial fibrillation (HCC)     Chronic anticoagulation     Chronic kidney disease     Colon polyps     Coronary artery disease     CPAP (continuous positive airway pressure) dependence     bi pap    GERD (gastroesophageal reflux disease)     Hyperlipidemia     Hypertrophic cardiomegaly     Irregular heart beat     a fib    Pneumonia     Seasonal allergies     Sleep apnea      Past Surgical History:   Procedure Laterality Date    CARDIAC DEFIBRILLATOR PLACEMENT  2018    CATARACT EXTRACTION  2019    CATARACT EXTRACTION W/ INTRAOCULAR LENS IMPLANT Right 4/9/2019    Procedure: EXCHANGE OF IOL;  Surgeon: Debborah Rubinstein, MD;  Location: [address] [place] [address];  Service: Ophthalmology    COLONOSCOPY      q 3 years   [facility] [place] FRACTURE SURGERY  1969    Lisa Lazier / REPLACE / Marilu Barbosa Right 3/26/2019    Procedure: PHACO W/IOL & LRI;  Surgeon: Debborah Rubinstein, MD;  Location: [address] [place] [address];  Service: Ophthalmology     [facility] CATARACT EXTRACAP,INSERT LENS Left 5/7/2019    Procedure: PHACO W/IOL & LRI;  Surgeon: Debborah Rubinstein, MD;  Location: [address] [place] [address];  Service: Ophthalmology    TONSILLECTOMY         The patient was admitted to the hospital at N/A on N/A for the following diagnosis:  Dizziness [R42]  Lightheadedness [R42]  Near syncope [R55]    Consults have been placed to:   IP CONSULT TO CARDIOLOGY    Vitals:    10/01/19 1045 10/01/19 1238 10/01/19 1239 10/01/19 1240   BP: 124/73 121/73 105/79 111/78   BP Location:       Pulse: 70 66 69 77   Resp: 19      Temp: 97 9 °F (36 6 °C)      TempSrc:       SpO2: 97% 97% 96% 95%   Weight:       Height:           Most recent labs:    Recent Labs     09/30/19  2015  10/01/19  0427   WBC 10 71*  --   --    HGB 13 6  --   --    HCT 38 9  --   --    *  --   --    K 3 1*  --  4 0   CALCIUM 8 0*  --  8 9   BUN 17  --  22   CREATININE 1 11  --  1 69*   TROPONINI <0 02   < > <0 02    < > = values in this interval not displayed         Scheduled Meds:  Current Facility-Administered Medications:  albuterol 2 puff Inhalation Q6H PRN Kody Ramsey, DO    apixaban 5 mg Oral BID Kody Ramsey, DO    budesonide-formoterol 2 puff Inhalation BID Kody Ramsey, DO    carvedilol 6 25 mg Oral Daily Kody Ramsey, DO    levothyroxine 112 mcg Oral Early Morning Kody Ramsey, DO    loratadine 10 mg Oral Daily Kody Ramsey, DO    pantoprazole 40 mg Oral Early Morning Kody Ramsey, DO    pravastatin 40 mg Oral Daily With [employer] Rigoberto, DO    sodium chloride 75 mL/hr Intravenous Continuous DILLAN Ch Last Rate: 75 mL/hr (10/01/19 1008)     Continuous Infusions:  sodium chloride 75 mL/hr Last Rate: 75 mL/hr (10/01/19 1008)     PRN Meds:   albuterol    Surgical procedures (if appropriate):

## 2019-10-01 NOTE — UTILIZATION REVIEW
Initial Clinical Review    Admission: Date/Time/Statement:  09/30/2019 @ 2210  Orders Placed This Encounter   Procedures    Place in Observation (expected length of stay for this patient is less than two midnights)     Standing Status:   Standing     Number of Occurrences:   1     Order Specific Question:   Admitting Physician     Answer:   Rachid Doran [[id number]]     Order Specific Question:   Level of Care     Answer:   Med Surg [16]     ED Arrival Information     Expected Arrival Acuity Means of Arrival Escorted By Service Admission Type    - 9/30/2019 18:21 Urgent Ambulance R [facility] [place] [facility] Hospitalist Urgent    Arrival Complaint    -dizziness        Chief Complaint   Patient presents with    Dizziness     pt  was at [facility] and had a "dizzy spell" while walking around, denies chest pain, denies sob, weakness     Assessment/Plan: 78year old male, presented to the ED from the [facility] via EMS  Admitted as Observation  due to pre-syncope  This evening at [facility] when he was standing at a buffet experienced episode of dizziness/lightheadedness improved by sitting down, however had recurrence of this while sitting down  Pre-syncope:  EKG nonischemic but with multiple T-wave inversions; no prior EKGs available for comparison  Initial troponin negative  Additionally ICD interrogation significant for 8 episodes of NSVT which did not trigger shock  Patient recently switched off metoprolol to Coreg approximately 10 days prior to this admission  In setting of extensive cardiac history admitted for further evaluation of above  Continue to trend troponin  Telemetry monitoring, check orthostatics  Cardiology Consult  OBSERVATION 09/30/2019 @ 2210, CONVERTED TO INPATIENT ADMISSION 10/01/2019 @ 1515, DUE TO FURTHER DIAGNOSTIC WORKUP, REQUIRING AT LEAST 2 MIDNIGHT STAY  The patient presented with near-syncope  EKG showed multiple T-wave inversions, no comparison    ICD interrogation was notable for eight episodes of NSVT which did not trigger shock  He also has acute kidney injury, pre renal   Intravenous fluid will be started  The patient is not yet ready for discharge and has become appropriate for change to inpatient class  The patient will be evaluated by Cardiology also  10/01/19 1515  Inpatient Admission Once     Transfer Service: General Medicine       Question Answer Comment   Admitting Physician August CLINE    Level of Care Med Surg    Estimated length of stay More than 2 Midnights    Certification I certify that inpatient services are medically necessary for this patient for a duration of greater than two midnights  See H&P and MD Progress Notes for additional information about the patient's course of treatment  10/01/2019Acute renal failure superimposed on stage 3 chronic kidney disease:  Creat 1 69 this am   Baseline appears to be 1 1-1 3  Likely prerenal secondary to dehydration  Hold lisinopril (did receive am dose today)  Check bladder scan  Start normal saline at 75ml/hr  Avoid nephrotoxic drugs and hypotension  10/01/26501  Consult Cardio:  -Dizziness:  -77 y/o male with 3 episodes of dizziness/spinning around the room  -H/O BPPV 3 years ago (reports similar episodes)  -Seems like patient was volume delpletd/hypoglycemic as apparently he just had 1 coffee in the morning yesterday which could also exacerbate HOCM  -Troponin- wnl  -ICD interrogation significant for 8 episodes of NSVT which did not trigger shock  -ECG- Sinus rhythm with 1st degree AV block  ST and T wave abnormality no other ECGs to compare  -ECHO-(8/2/19) EF-65%, increased apical wall thickness consistent with apical HOCM  -He already has Cardiac MRI scheduled in 6 months  -F/U with his cardiologist Dr Hao Sandoval ([facility]) on discharge   -We think at this time from cardiac point of view we don't need to do any more testings and he is ready to discharge from our side       ED Triage Vitals [19 1829]   Temperature Pulse Respirations Blood Pressure SpO2   98 6 °F (37 °C) 86 18 140/71 98 %      Temp Source Heart Rate Source Patient Position - Orthostatic VS BP Location FiO2 (%)   Oral Monitor Sitting Right arm --      Pain Score       No Pain        Wt Readings from Last 1 Encounters:   10/01/19 102 kg (224 lb 9 6 oz)     Additional Vital Signs:   Date/Time  Temp  Pulse  Resp  BP  MAP (mmHg)  SpO2  O2 Device  Patient Position - Orthostatic VS   10/01/19 0807    83    114/67           10/01/19 0700    74    111/66  81  96 %       10/01/19 06:51:23  99 3 °F (37 4 °C)  86  21  111/66  81  95 %    Standing - Orthostatic VS   10/01/19 06:50:21  99 3 °F (37 4 °C)  78  17  116/67  83  97 %    Sitting - Orthostatic VS   10/01/19 06:49:13  99 3 °F (37 4 °C)  72  19  121/68  86  95 %    Lying - Orthostatic VS   10/01/19 04:21:01    60  20  91/53  66  97 %       10/01/19 00:44:13    96    132/68  89  98 %       10/01/19 00:42:26    87    114/64  81  97 %    Sitting - Orthostatic VS   10/01/19 00:40:02  98 2 °F (36 8 °C)  84    113/63  80  94 %    Lying - Orthostatic VS   19 2229    92  18      95 %       19 2049    89  19  151/70    93 %  None (Room air)  Lying   19 1831              None (Room air)     19 1829  98 6 °F (37 °C)  86  18  140/71    98 %  None (Room air)  Sitting       Pertinent Labs/Diagnostic Test Results:   2019 @ 2036  CT head:  No acute intracranial abnormality  10/01/2019 @ 0854  Chest X:  Left-sided AICD/pacemaker   No active pulmonary disease      2019 @   EC, sinus rhythm with sinus arrhythmia with 1st degree A-V block,  ST & T wave-consider inferior ischemia / anterolateral ischemia    Results from last 7 days   Lab Units 19   WBC Thousand/uL 10 71*   HEMOGLOBIN g/dL 13 6   HEMATOCRIT % 38 9   PLATELETS Thousands/uL 129*   BANDS PCT % 3     Results from last 7 days   Lab Units 10/01/19  0427 09/30/19  2015   SODIUM mmol/L 137 141   POTASSIUM mmol/L 4 0 3 1*   CHLORIDE mmol/L 106 111*   CO2 mmol/L 26 23   ANION GAP mmol/L 5 7   BUN mg/dL 22 17   CREATININE mg/dL 1 69* 1 11   EGFR ml/min/1 73sq m 40 67   CALCIUM mg/dL 8 9 8 0*   MAGNESIUM mg/dL 2 8* 1 6     Results from last 7 days   Lab Units 10/01/19  0427 09/30/19  2015   GLUCOSE RANDOM mg/dL 106 74     Results from last 7 days   Lab Units 10/01/19  0427 10/01/19  0031 09/30/19 2015   TROPONIN I ng/mL <0 02 <0 02 <0 02     Results from last 7 days   Lab Units 10/01/19  0427   TSH 3RD GENERATON uIU/mL 0 780     Results from last 7 days   Lab Units 09/30/19 2015   TOTAL COUNTED  100     ED Treatment:   Medication Administration from 09/30/2019 1821 to 09/30/2019 2311       Date/Time Order Dose Route Action     09/30/2019 2226 potassium chloride (K-DUR,KLOR-CON) CR tablet 40 mEq 40 mEq Oral Given        Past Medical History:   Diagnosis Date    Arthritis     Atrial fibrillation (HCC)     Chronic anticoagulation     Chronic kidney disease     Colon polyps     Coronary artery disease     CPAP (continuous positive airway pressure) dependence     bi pap    GERD (gastroesophageal reflux disease)     Hyperlipidemia     Hypertrophic cardiomegaly     Irregular heart beat     a fib    Pneumonia     Seasonal allergies     Sleep apnea      Present on Admission:   Pre-syncope   Apical variant hypertrophic cardiomyopathy (HCC)   Chronic diastolic heart failure (HCC)   Hypothyroidism   JUANI (obstructive sleep apnea)   Paroxysmal atrial fibrillation (HCC)   Sick sinus syndrome (HCC)   Hypokalemia      Admitting Diagnosis: Dizziness [R42]  Lightheadedness [R42]  Near syncope [R55]  Age/Sex: 78 y o  male  Admission Orders:  Current Facility-Administered Medications:  albuterol 2 puff Inhalation Q6H PRN   apixaban 5 mg Oral BID   budesonide-formoterol 2 puff Inhalation BID   carvedilol 6 25 mg Oral Daily   levothyroxine 112 mcg Oral Early Morning   lisinopril 40 mg Oral Daily   loratadine 10 mg Oral Daily   pantoprazole 40 mg Oral Early Morning   pravastatin 40 mg Oral Daily With Dinner   RACHAEL Hanley  IP CONSULT TO [facility] Utilization Review Department  Phone: [phone]; Fax [phone]  [email or web address]  ATTENTION: Please call with any questions or concerns to [phone]  and carefully listen to the prompts so that you are directed to the right person  Send all requests for admission clinical reviews, approved or denied determinations and any other requests to fax [phone]   All voicemails are confidential

## 2019-10-01 NOTE — ED NOTES
Pacemaker interrogation done   [employer] will call back if anything abnormal shows up      Rekha Griffith  09/30/19 8421

## 2019-10-01 NOTE — PROGRESS NOTES
Progress Note - Omar Taveras 1950, 78 y o  male MRN: [id number]    Unit/Bed#: CW2 211-01 Encounter: [id number]    Primary Care Provider: Gianna Cornelius MD   Date and time admitted to hospital: 9/30/2019  6:34 PM     addendum: 10/1/19 1515- per patient request a BMP was obtained for this afternoon to check his creatinine  Creat 1 82 on afternoon check  Pt will now require > 2 MN stay for treatment of LAKISHA  Check am bmp  Check renal US, PVR  Consider nephrology consult if creat worsens  * Pre-syncope  Assessment & Plan  · Presenting from [facility] with several episodes of dizziness with apparent reported near syncope  · Has been asymptomatic since admission  · EKG nonischemic but with multiple T-wave inversions; no prior EKGs available for comparison      · Trop x3 negative   · Additionally ICD interrogation significant for 8 episodes of NSVT which did not trigger shock  · Patient recently switched off metoprolol to Coreg approximately 10 days prior to this admission  · In setting of extensive cardiac history admitted for further evaluation of above  · Telemetry monitoring, check orthostatics  · Consult cardiology d/t significant cardiac history    Acute renal failure superimposed on stage 3 chronic kidney disease (HCC)  Assessment & Plan  · Creat 1 69 this am   · Baseline appears to be 1 1-1 3  · Likely prerenal secondary to dehydration  · Hold lisinopril (did receive am dose today)  · Check bladder scan   · Start normal saline at 75ml/hr   · Avoid nephrotoxic drugs and hypotension     Thrombocytopenia (HCC)  Assessment & Plan  · plts 129  · Check am cbc     Sick sinus syndrome (HCC)  Assessment & Plan  · S/p AICD    Paroxysmal atrial fibrillation (HCC)  Assessment & Plan  · S/p AICD  · Continue beta-blocker  · Anticoagulated on Eliquis    JUANI (obstructive sleep apnea)  Assessment & Plan  · Continue nightly CPAP    Hypothyroidism  Assessment & Plan  · TSH 0 780  · Continue Synthroid     Apical variant hypertrophic cardiomyopathy ([facility] )  Assessment & Plan  · Echocardiogram 2019 again showing apical hypertrophy  · S/P ICD  · Follows with [facility]    Chronic diastolic heart failure ([facility] )  Assessment & Plan  Wt Readings from Last 3 Encounters:   10/01/19 102 kg (224 lb 9 6 oz)   19 104 kg (230 lb)   19 99 8 kg (220 lb)     · Appears euvolemic to examination  · Continue medications for this, and monitor daily weights        Hypokalemiaresolved as of 10/1/2019  Assessment & Plan  · K repleted     VTE Pharmacologic Prophylaxis:   Pharmacologic: Apixaban (Eliquis)  Mechanical VTE Prophylaxis in Place: Yes    Patient Centered Rounds: I have performed bedside rounds with nursing staff today  Discussions with Specialists or Other Care Team Provider: d/w RN     Education and Discussions with Family / Patient: d/w patient  Time Spent for Care: 30 minutes  More than 50% of total time spent on counseling and coordination of care as described above  Current Length of Stay: 0 day(s)    Current Patient Status: Observation   Certification Statement: pt is observation     Discharge Plan: possible d/c tomorrow     Code Status: Level 3 - DNAR and DNI      Subjective:   Patient reports that he is feeling normal   He denies any further episodes of presyncope or syncope while hospitalized  Patient denies any further episodes of dizziness  Patient admits to not eating or drinking much [place]  Patient denies all other complaints  Patient reports he wants to get home because he has concert tonight that he wants to get to     Objective:     Vitals:   Temp (24hrs), Av 8 °F (37 1 °C), Min:97 9 °F (36 6 °C), Max:99 3 °F (37 4 °C)    Temp:  [97 9 °F (36 6 °C)-99 3 °F (37 4 °C)] 97 9 °F (36 6 °C)  HR:  [60-96] 70  Resp:  [17-21] 19  BP: ()/(53-73) 124/73  SpO2:  [93 %-98 %] 97 %  Body mass index is 32 23 kg/m²  Input and Output Summary (last 24 hours):        Intake/Output Summary (Last 24 hours) at 10/1/2019 1157  Last data filed at 10/1/2019 0901  Gross per 24 hour   Intake 400 ml   Output 350 ml   Net 50 ml       Physical Exam:     Physical Exam   Constitutional: He is oriented to person, place, and time  No distress  Neck: Neck supple  Cardiovascular: Normal rate, regular rhythm, normal heart sounds and intact distal pulses  No murmur heard  Pulmonary/Chest: Effort normal and breath sounds normal  No respiratory distress  He has no wheezes  He has no rales  Abdominal: Soft  Bowel sounds are normal  He exhibits no distension  There is no tenderness  There is no rebound  Musculoskeletal: He exhibits no edema or tenderness  Neurological: He is alert and oriented to person, place, and time  Skin: Skin is warm and dry  No rash noted  He is not diaphoretic  No erythema  Psychiatric: He has a normal mood and affect  Additional Data:     Labs:    Results from last 7 days   Lab Units 09/30/19 2015   WBC Thousand/uL 10 71*   HEMOGLOBIN g/dL 13 6   HEMATOCRIT % 38 9   PLATELETS Thousands/uL 129*   BANDS PCT % 3   LYMPHO PCT % 2*   MONO PCT % 3*   EOS PCT % 1     Results from last 7 days   Lab Units 10/01/19  0427   SODIUM mmol/L 137   POTASSIUM mmol/L 4 0   CHLORIDE mmol/L 106   CO2 mmol/L 26   BUN mg/dL 22   CREATININE mg/dL 1 69*   ANION GAP mmol/L 5   CALCIUM mg/dL 8 9   GLUCOSE RANDOM mg/dL 106                           * I Have Reviewed All Lab Data Listed Above  * Additional Pertinent Lab Tests Reviewed:  All Labs Within Last 24 Hours Reviewed    Imaging:    Imaging Reports Reviewed Today Include: chest xray, CT head,   Recent Cultures (last 7 days):           Last 24 Hours Medication List:     Current Facility-Administered Medications:  albuterol 2 puff Inhalation Q6H PRN Brian Hdez, DO    apixaban 5 mg Oral BID Brian Hdez, DO    budesonide-formoterol 2 puff Inhalation BID Brian Hdez, DO    carvedilol 6 25 mg Oral Daily Brian Hdez, DO levothyroxine 112 mcg Oral Early Morning Dodie Raegan, DO    loratadine 10 mg Oral Daily Dodie Raegan, DO    pantoprazole 40 mg Oral Early Morning Dodie Raegan, DO    pravastatin 40 mg Oral Daily With [employer] Rigoebrto, DO    sodium chloride 75 mL/hr Intravenous Continuous DILLAN Ch Last Rate: 75 mL/hr (10/01/19 1008)        Today, Patient Was Seen By: DILLAN Sutton    ** Please Note: Dictation voice to text software may have been used in the creation of this document   **

## 2019-10-01 NOTE — ASSESSMENT & PLAN NOTE
· Presenting from [facility] with several episodes of dizziness with apparent reported near syncope  · Has been asymptomatic since ED arrival  · EKG nonischemic but with multiple T-wave inversions; no prior EKGs available for comparison    Initial troponin negative  · Additionally ICD interrogation significant for 8 episodes of NSVT which did not trigger shock  · Patient recently switched off metoprolol to Coreg approximately 10 days prior to this admission  · In setting of extensive cardiac history admitted for further evaluation of above  · Continue to trend troponin  · Telemetry monitoring, check orthostatics  · Will ask for Cardiology input given significant cardiac history

## 2019-10-01 NOTE — CONSULTS
Consultation - Cardiology   Bryce Tirado 78 y o  male MRN: [id number]  Unit/Bed#: CW2 211-01 Encounter: [id number]    Assessment/Plan     1-Dizziness  -77 y/o male with 3 episodes of dizziness/spinning around the room  -H/O BPPV 3 years ago (reports similar episodes)  -Seems like patient was volume delpletd/hypoglycemic as apparently he just had 1 coffee in the morning yesterday which could also exacerbate HOCM  -Troponin- wnl  -ICD interrogation significant for 8 episodes of NSVT which did not trigger shock  -ECG- Sinus rhythm with 1st degree AV block  ST and T wave abnormality no other ECGs to compare  -ECHO-(8/2/19) EF-65%, increased apical wall thickness consistent with apical HOCM  -He already has Cardiac MRI scheduled in 6 months  -F/U with his cardiologist Dr Deborah Baldwin ([facility]) on discharge   -We think at this time from cardiac point of view we don't need to do any more testings and he is ready to discharge from our side  2-Paroxysmal atrial fibrillation  S/p AICD  Continue beta-blocker and Eliquis  3-Apical variant Hypertrophic Cardiomyopathy  S/p AICD  -Cardiac MRI after 6 months, f/u with Dr Deborah Baldwin  History of Present Illness   Physician Requesting Consult: Kandice Morley MD  Reason for Consult / Principal Problem: Dizziness  HPI: Abby Gonsalez is a 78y o  year old male with h/o Apical variant of HOCM s/p AICD, paroxysmal Atrial fibrillation, sick sinus syndrome, JUANI, BPPV (3 years ago), JUANI, Ch  Diastolic H F, HTN, HLD, Asthma presents with multiple episodes of dizziness/vertigo  He says yesterday he and his wife got exhausted/stressed out when they were not able to get the concert tickets then around 3:00 PM they were standing in buffet line patient became lightheaded/dizzi for few seconds which got resolved on its own, 2nd episode happened when he stood up again and started turning it also lasted just for few seconds   3rd when he was on stretcher he felt he was spinning around everything, sat down and felt better  He had no episodes in the ED  He also mentioned similar episode of dizziness about 3 years ago that time he was told he had crystals in his ear and after the maneuver his symptoms were resolved  He denies LOC, any falls, blurry vision, chest pain/pressure, nausea/vomiting, SOB, palpitations during the episodes  He also mentioned low p o intake yesterday  He said he just had coffee around 10:30 am and nothing after that  During my evaluation he feels better denies any complaints  Denies any more episodes of dizziness/lightheadedness  Inpatient consult to Cardiology     Performed by  Marleny Bansal MD     Authorized by Loc Last DO              Review of Systems   Constitutional: Negative for activity change, appetite change, chills, diaphoresis, fatigue and fever  HENT: Negative for congestion, sore throat, tinnitus and trouble swallowing  Eyes: Negative for photophobia, discharge, redness and visual disturbance  Respiratory: Negative for apnea, cough, chest tightness, shortness of breath and wheezing  Cardiovascular: Negative for chest pain, palpitations and leg swelling  Gastrointestinal: Negative for abdominal distention, abdominal pain, blood in stool, constipation, diarrhea, nausea and vomiting  Genitourinary: Negative for difficulty urinating, dysuria and urgency  Musculoskeletal: Negative for arthralgias, back pain, gait problem, neck pain and neck stiffness  Skin: Negative for pallor, rash and wound  Neurological: Positive for dizziness (Currently resolved), light-headedness (Currently resolved) and headaches (Mild headache yesterday currently resolved)  Negative for tremors, seizures, syncope, facial asymmetry, speech difficulty, weakness and numbness  Hematological: Negative for adenopathy  Does not bruise/bleed easily  Psychiatric/Behavioral: Negative for agitation and confusion       Physical Exam   Constitutional: He is oriented to person, place, and time  He appears well-developed and well-nourished  No distress  HENT:   Head: Normocephalic and atraumatic  Eyes: Pupils are equal, round, and reactive to light  EOM are normal    Neck: Normal range of motion  No JVD present  No tracheal deviation present  No thyromegaly present  Cardiovascular: Normal rate, regular rhythm, normal heart sounds and intact distal pulses  Exam reveals no gallop and no friction rub  No murmur heard  Pulmonary/Chest: Effort normal and breath sounds normal  No stridor  No respiratory distress  He has no wheezes  He exhibits no tenderness  Abdominal: Soft  Bowel sounds are normal  He exhibits no distension and no mass  There is no tenderness  No hernia  Musculoskeletal: Normal range of motion  He exhibits no edema, tenderness or deformity  Lymphadenopathy:     He has no cervical adenopathy  Neurological: He is alert and oriented to person, place, and time  Skin: Skin is warm  No rash noted  He is not diaphoretic  No erythema  Psychiatric: He has a normal mood and affect       Historical Information   Past Medical History:   Diagnosis Date    Arthritis     Atrial fibrillation (HCC)     Chronic anticoagulation     Chronic kidney disease     Colon polyps     Coronary artery disease     CPAP (continuous positive airway pressure) dependence     bi pap    GERD (gastroesophageal reflux disease)     Hyperlipidemia     Hypertrophic cardiomegaly     Irregular heart beat     a fib    Pneumonia     Seasonal allergies     Sleep apnea      Past Surgical History:   Procedure Laterality Date    CARDIAC DEFIBRILLATOR PLACEMENT  2018    CATARACT EXTRACTION  2019    CATARACT EXTRACTION W/ INTRAOCULAR LENS IMPLANT Right 4/9/2019    Procedure: EXCHANGE OF IOL;  Surgeon: Devendra Mukherjee MD;  Location:  MAIN OR;  Service: Ophthalmology    COLONOSCOPY      q 3 years    FRACTURE SURGERY  1969    Clair Irizarry / Lukas Agosto / [address] [place] [address] [address] [facility] CATARACT EXTRACAP,INSERT LENS Right 3/26/2019    Procedure: PHACO W/IOL & LRI;  Surgeon: Margoth Feliz MD;  Location: [address] [place] [address] OR;  Service: Ophthalmology     [address] CATARACT EXTRACAP,INSERT LENS Left 5/7/2019    Procedure: PHACO W/IOL & LRI;  Surgeon: Margoth Feliz MD;  Location: [address] [place] [address];  Service: Ophthalmology    TONSILLECTOMY       Social History     Substance and Sexual Activity   Alcohol Use Yes    Frequency: Monthly or less     Social History     Substance and Sexual Activity   Drug Use Never     Social History     Tobacco Use   Smoking Status Never Smoker   Smokeless Tobacco Never Used     Family History:   Family History   Problem Relation Age of Onset    Colon cancer Mother        Meds/Allergies   current meds:   Current Facility-Administered Medications   Medication Dose Route Frequency    albuterol (PROVENTIL HFA,VENTOLIN HFA) inhaler 2 puff  2 puff Inhalation Q6H PRN    apixaban (ELIQUIS) tablet 5 mg  5 mg Oral BID    budesonide-formoterol (SYMBICORT) 160-4 5 mcg/act inhaler 2 puff  2 puff Inhalation BID    carvedilol (COREG) tablet 6 25 mg  6 25 mg Oral Daily    levothyroxine tablet 112 mcg  112 mcg Oral Early Morning    lisinopril (ZESTRIL) tablet 40 mg  40 mg Oral Daily    loratadine (CLARITIN) tablet 10 mg  10 mg Oral Daily    pantoprazole (PROTONIX) EC tablet 40 mg  40 mg Oral Early Morning    pravastatin (PRAVACHOL) tablet 40 mg  40 mg Oral Daily With Dinner    sodium chloride 0 9 % infusion  75 mL/hr Intravenous Continuous    and PTA meds:   Prior to Admission Medications   Prescriptions Last Dose Informant Patient Reported? Taking?    COENZYME Q10-OMEGA 3 FATTY ACD PO 9/30/2019 at Unknown time Self Yes Yes   Sig: Take 2,000 mg by mouth daily   ELIQUIS 5 MG 9/30/2019 at Unknown time Self Yes Yes   Sig: Take 5 mg by mouth 2 (two) times a day    Flaxseed, Linseed, (FLAXSEED OIL) 1000 MG CAPS 9/30/2019 at Unknown time Self Yes Yes   Sig: Take 2,000 mg by mouth daily   SYMBICORT 160-4 5 MCG/ACT inhaler 2019 at Unknown time Self Yes Yes   Sig: Inhale 2 puffs 2 (two) times a day    albuterol (PROVENTIL HFA,VENTOLIN HFA) 90 mcg/act inhaler  Self Yes No   Sig: Inhale 2 puffs every 6 (six) hours as needed for wheezing   carvedilol (COREG) 6 25 mg tablet 2019 at Unknown time  Yes Yes   Sig: Take 6 25 mg by mouth daily   esomeprazole (NexIUM) 40 MG capsule 2019 at Unknown time Self Yes Yes   Sig: Take 40 mg by mouth every morning before breakfast   ibuprofen (MOTRIN) 200 mg tablet Past Week at Unknown time  Yes Yes   Sig: Take by mouth every 6 (six) hours as needed for mild pain   levothyroxine 112 mcg tablet 2019 at Unknown time Self Yes Yes   Sig: Take 112 mcg by mouth daily    lisinopril (ZESTRIL) 40 mg tablet 2019 at Unknown time Self Yes Yes   Sig: Take 40 mg by mouth daily    loratadine (CLARITIN) 10 mg tablet 2019 at Unknown time Self Yes Yes   Sig: Take 10 mg by mouth daily   metoprolol succinate (TOPROL-XL) 25 mg 24 hr tablet Not Taking at Unknown time Self Yes No   Si mg daily    simvastatin (ZOCOR) 20 mg tablet 2019 at Unknown time Self Yes Yes   Sig: Take 20 mg by mouth daily at bedtime    travoprost (TRAVATAN-Z) 0 004 % ophthalmic solution  Self Yes No   Si drop daily at bedtime      Facility-Administered Medications: None     Allergies   Allergen Reactions    Procardia [Nifedipine] Palpitations       Objective   Vitals: Blood pressure 111/66, pulse 74, temperature 99 3 °F (37 4 °C), resp  rate 21, height 5' 10" (1 778 m), weight 102 kg (224 lb 9 6 oz), SpO2 96 %    Orthostatic Blood Pressures      Most Recent Value   Blood Pressure  111/66 filed at 10/01/2019 0700   Patient Position - Orthostatic VS  Sitting - Orthostatic VS filed at 10/01/2019 0042            Intake/Output Summary (Last 24 hours) at 10/1/2019 0824  Last data filed at 10/1/2019 0101  Gross per 24 hour   Intake 400 ml   Output 250 ml   Net 150 ml       Invasive Devices Peripheral Intravenous Line            Peripheral IV 05/07/19 147 days    Peripheral IV 09/30/19 Left Antecubital less than 1 day                  Lab Results:   CBC with diff:   Results from last 7 days   Lab Units 09/30/19 2015   WBC Thousand/uL 10 71*   RBC Million/uL 4 15   HEMOGLOBIN g/dL 13 6   HEMATOCRIT % 38 9   MCV fL 94   MCH pg 32 8   MCHC g/dL 35 0   RDW % 12 7   MPV fL 10 2   PLATELETS Thousands/uL 129*     CMP:   Results from last 7 days   Lab Units 10/01/19  0427   SODIUM mmol/L 137   POTASSIUM mmol/L 4 0   CHLORIDE mmol/L 106   CO2 mmol/L 26   BUN mg/dL 22   CREATININE mg/dL 1 69*   CALCIUM mg/dL 8 9   EGFR ml/min/1 73sq m 40     Troponin:   0   Lab Value Date/Time    TROPONINI <0 02 10/01/2019 0427    TROPONINI <0 02 10/01/2019 0031    TROPONINI <0 02 09/30/2019 2015     BNP:   Results from last 7 days   Lab Units 10/01/19  0427   POTASSIUM mmol/L 4 0   CHLORIDE mmol/L 106   CO2 mmol/L 26   BUN mg/dL 22   CREATININE mg/dL 1 69*   CALCIUM mg/dL 8 9   EGFR ml/min/1 73sq m 40     Coags:     TSH:   Results from last 7 days   Lab Units 10/01/19  0427   TSH 3RD GENERATON uIU/mL 0 780     Magnesium:   Results from last 7 days   Lab Units 10/01/19  0427   MAGNESIUM mg/dL 2 8*     Lipid Profile:     Imaging: I have personally reviewed pertinent reports  EKG: Sinus rhythm with 1st degree AV block  ST and T wave abnormality no other ECGs to compare  VTE Prophylaxis: Sequential compression device (Venodyne)  and Enoxaparin (Lovenox)    Code Status: Level 3 - DNAR and DNI  Advance Directive and Living Will:      Power of :    POLST:      Counseling / Coordination of Care  Total floor / unit time spent today 60 minutes  Greater than 50% of total time was spent with the patient and / or family counseling and / or coordination of care    A description of the counseling / coordination of care:

## 2019-10-01 NOTE — H&P
H&P- Luis M Botello 1950, 78 y o  male MRN: [id number]    Unit/Bed#: 2 211-01 Encounter: [id number]    Primary Care Provider: Fredis Khan MD   Date and time admitted to hospital: 9/30/2019  6:34 PM        * Pre-syncope  Assessment & Plan  · Presenting from [facility] with several episodes of dizziness with apparent reported near syncope  · Has been asymptomatic since ED arrival  · EKG nonischemic but with multiple T-wave inversions; no prior EKGs available for comparison  Initial troponin negative  · Additionally ICD interrogation significant for 8 episodes of NSVT which did not trigger shock  · Patient recently switched off metoprolol to Coreg approximately 10 days prior to this admission  · In setting of extensive cardiac history admitted for further evaluation of above  · Continue to trend troponin  · Telemetry monitoring, check orthostatics  · Will ask for Cardiology input given significant cardiac history    Hypokalemia  Assessment & Plan  · K 3 1 on admission and repleted in the ED  Will recheck with a m  Labs  · Additionally magnesium 1 6 and will replete, recheck with a m  Labs    Sick sinus syndrome [facility])  Assessment & Plan  · S/p AICD    Paroxysmal atrial fibrillation (HCC)  Assessment & Plan  · S/p AICD  · Continue beta-blocker  · Anticoagulated on Eliquis    JUANI (obstructive sleep apnea)  Assessment & Plan  · Continue nightly CPAP    Hypothyroidism  Assessment & Plan  · Will check TSH with a m  Labs    Continue Synthroid meantime    Apical variant hypertrophic cardiomyopathy ([address] )  Assessment & Plan  · Echocardiogram 08/2019 again showing apical hypertrophy  · S/P ICD  · Follows with [facility]    Chronic diastolic heart failure [facility])  Assessment & Plan  Wt Readings from Last 3 Encounters:   10/01/19 102 kg (224 lb 9 6 oz)   09/05/19 104 kg (230 lb)   06/18/19 99 8 kg (220 lb)     · Appears euvolemic to examination  · Continue medications for this, and monitor daily weights            VTE Prophylaxis: Apixaban (Eliquis)  / sequential compression device   Code Status: Level 3 - DNAR and DNI  as discussed with patient at bedside  POLST: POLST form is not discussed and not completed at this time  Anticipated Length of Stay:  Patient will be admitted on an Observation basis with an anticipated length of stay of  less than 2 midnights  Justification for Hospital Stay: Please see detailed plans noted above  Chief Complaint:     Dizziness and near syncope  History of Present Illness:  Renea Sterling is a 78 y o  male who presents with multiple episodes of dizziness and near-syncope while at the [facility] this evening  He has a past medical history significant for apical HOCM s/p AICD, sick sinus syndrome, atrial fibrillation anticoagulated on Eliquis, hypothyroidism, hypertension, hyperlipidemia, JUANI on nightly CPAP  Patient reported that he was in his normal state of health until this evening at [facility] when he was standing at a buffet experienced episode of dizziness/lightheadedness improved by sitting down, however had recurrence of this while sitting down  He did not lose consciousness, but stated that he felt he could  Denied any falls, and denied any chest pain/pressure, palpitations, or shortness of breath during or after the episode  The episode was witnessed by his wife, who advised patient brought to the hospital for evaluation  He apparently had 3rd episode of dizziness on arrival to ED while being transferred from stretcher to bed, but other than this had no further episodes  At the time my evaluation, patient states he felt well, and denied any fevers/chills, chest pain/pressure, nausea/vomiting, shortness of breath, dizziness or lightheadedness, or palpitations    Endorse compliance with his medications, but noted that approximately 10 days ago he was taken off metoprolol and switched to Coreg and his cardiologist's request   Additionally, he admitted to poor p o  Intake today, and stated he only drank a single cup of coffee all day        Review of Systems:    Constitutional:  Denies fever or chills   Eyes:  Denies change in visual acuity   HENT:  Denies nasal congestion or sore throat   Respiratory:  Denies cough or shortness of breath   Cardiovascular:  Denies chest pain or edema   GI:  Denies abdominal pain, nausea, vomiting, bloody stools or diarrhea   :  Denies dysuria   Musculoskeletal:  Denies back pain or joint pain   Integument:  Denies rash   Neurologic:  Denies headache, focal weakness or sensory changes but endorsed dizziness  Endocrine:  Denies polyuria or polydipsia   Lymphatic:  Denies swollen glands   Psychiatric:  Denies depression or anxiety     Past Medical and Surgical History:   Past Medical History:   Diagnosis Date    Arthritis     Atrial fibrillation (HCC)     Chronic anticoagulation     Chronic kidney disease     Colon polyps     Coronary artery disease     CPAP (continuous positive airway pressure) dependence     bi pap    GERD (gastroesophageal reflux disease)     Hyperlipidemia     Hypertrophic cardiomegaly     Irregular heart beat     a fib    Pneumonia     Seasonal allergies     Sleep apnea      Past Surgical History:   Procedure Laterality Date    CARDIAC DEFIBRILLATOR PLACEMENT  2018    CATARACT EXTRACTION  2019    CATARACT EXTRACTION W/ INTRAOCULAR LENS IMPLANT Right 4/9/2019    Procedure: EXCHANGE OF IOL;  Surgeon: Vero Jaramillo MD;  Location: [address] [place] [address];  Service: Ophthalmology    COLONOSCOPY      q 3 years    FRACTURE SURGERY  1969    Casie Jaime / REPLACE / Aury Hearing       [address] Right 3/26/2019    Procedure: PHACO W/IOL & LRI;  Surgeon: Vero Jaramillo MD;  Location: [address] [place] [address];  Service: Ophthalmology     [facility] CATARACT EXTRACAP,INSERT LENS Left 5/7/2019    Procedure: DR LOW DELAROSA [facility] W/IOL & LRI;  Surgeon: Vero Jaramillo MD;  Location: [facility];  Service: Ophthalmology    TONSILLECTOMY Meds/Allergies:  Medications Prior to Admission   Medication    carvedilol (COREG) 6 25 mg tablet    COENZYME Q10-OMEGA 3 FATTY ACD PO    ELIQUIS 5 MG    esomeprazole (NexIUM) 40 MG capsule    Flaxseed, Linseed, (FLAXSEED OIL) 1000 MG CAPS    ibuprofen (MOTRIN) 200 mg tablet    levothyroxine 112 mcg tablet    lisinopril (ZESTRIL) 40 mg tablet    loratadine (CLARITIN) 10 mg tablet    simvastatin (ZOCOR) 20 mg tablet    SYMBICORT 160-4 5 MCG/ACT inhaler    albuterol (PROVENTIL HFA,VENTOLIN HFA) 90 mcg/act inhaler    metoprolol succinate (TOPROL-XL) 25 mg 24 hr tablet    travoprost (TRAVATAN-Z) 0 004 % ophthalmic solution       Allergies: Allergies   Allergen Reactions    Procardia [Nifedipine] Palpitations     History:  Marital Status: /Civil Union     Substance Use History:   Social History     Substance and Sexual Activity   Alcohol Use Yes    Frequency: Monthly or less     Social History     Tobacco Use   Smoking Status Never Smoker   Smokeless Tobacco Never Used     Social History     Substance and Sexual Activity   Drug Use Never       Family History:  Family History   Problem Relation Age of Onset    Colon cancer Mother        Physical Exam:     Vitals:   Blood Pressure: 132/68 (10/01/19 0044)  Pulse: 96 (10/01/19 0044)  Temperature: 98 2 °F (36 8 °C) (10/01/19 0040)  Temp Source: Oral (10/01/19 0040)  Respirations: 18 (09/30/19 2229)  Height: 5' 10" (177 8 cm) (10/01/19 0040)  Weight - Scale: 102 kg (224 lb 9 6 oz) (10/01/19 0040)  SpO2: 98 % (10/01/19 0044)    Constitutional:  Well developed, well nourished, no acute distress, non-toxic appearance   Eyes:  PERRL, conjunctiva normal   HENT:  Atraumatic, external ears normal, nose normal, oropharynx moist, no pharyngeal exudates   Neck- normal range of motion, no tenderness, supple   Respiratory:  No respiratory distress, normal breath sounds, no rales, no wheezing   Cardiovascular:  Normal rate, normal rhythm, no murmurs, no gallops, no rubs   GI:  Soft, nondistended, normal bowel sounds, nontender, no organomegaly, no mass, no rebound, no guarding   :  No costovertebral angle tenderness   Musculoskeletal:  No edema, no tenderness, no deformities  Back- no tenderness  Integument:  Well hydrated, no rash   Lymphatic:  No lymphadenopathy noted   Neurologic:  Alert &awake, communicative, CN 2-12 normal, normal motor function, normal sensory function, no focal deficits noted   Psychiatric:  Speech and behavior appropriate       Lab Results: I have personally reviewed pertinent reports  Results from last 7 days   Lab Units 09/30/19 2015   WBC Thousand/uL 10 71*   HEMOGLOBIN g/dL 13 6   HEMATOCRIT % 38 9   PLATELETS Thousands/uL 129*   LYMPHO PCT % 2*   MONO PCT % 3*   EOS PCT % 1     Results from last 7 days   Lab Units 09/30/19 2015   POTASSIUM mmol/L 3 1*   CHLORIDE mmol/L 111*   CO2 mmol/L 23   BUN mg/dL 17   CREATININE mg/dL 1 11   CALCIUM mg/dL 8 0*           EKG:  Sinus rhythm with first-degree AV block, T-wave inversions in leads 1, 2, aVL, AVF, V4, V5, V6    Imaging: I have personally reviewed pertinent reports  CXR: personally reviewed, no acute cardiopulmonary disease visualized  Final radiologist read is pending  Ct Head Without Contrast    Result Date: 9/30/2019  Narrative: CT BRAIN - WITHOUT CONTRAST INDICATION:   Dizziness  COMPARISON:  None  TECHNIQUE:  CT examination of the brain was performed  In addition to axial images, coronal 2D reformatted images were created and submitted for interpretation  Radiation dose length product (DLP) for this visit:  1007 58 mGy-cm   This examination, like all CT scans performed in the [facility], was performed utilizing techniques to minimize radiation dose exposure, including the use of iterative reconstruction and automated exposure control  IMAGE QUALITY:  Diagnostic  FINDINGS: PARENCHYMA:  No intracranial mass, mass effect or midline shift   No CT signs of acute infarction  No acute parenchymal hemorrhage  VENTRICLES AND EXTRA-AXIAL SPACES:  There is mild bifrontal volume loss  VISUALIZED ORBITS AND PARANASAL SINUSES:  Unremarkable  CALVARIUM AND EXTRACRANIAL SOFT TISSUES:  Normal      Impression: No acute intracranial abnormality  Workstation performed: [id number]         ** Please Note: Dragon 360 Dictation voice to text software was used in the creation of this document   **

## 2019-10-02 VITALS
BODY MASS INDEX: 32.16 KG/M2 | TEMPERATURE: 98.4 F | WEIGHT: 224.6 LBS | OXYGEN SATURATION: 98 % | SYSTOLIC BLOOD PRESSURE: 129 MMHG | HEART RATE: 80 BPM | HEIGHT: 70 IN | DIASTOLIC BLOOD PRESSURE: 79 MMHG | RESPIRATION RATE: 19 BRPM

## 2019-10-02 LAB
ANION GAP SERPL CALCULATED.3IONS-SCNC: 7 MMOL/L (ref 4–13)
BUN SERPL-MCNC: 18 MG/DL (ref 5–25)
CALCIUM SERPL-MCNC: 8.8 MG/DL (ref 8.3–10.1)
CHLORIDE SERPL-SCNC: 111 MMOL/L (ref 100–108)
CO2 SERPL-SCNC: 23 MMOL/L (ref 21–32)
CREAT SERPL-MCNC: 1.36 MG/DL (ref 0.6–1.3)
ERYTHROCYTE [DISTWIDTH] IN BLOOD BY AUTOMATED COUNT: 12.9 % (ref 11.6–15.1)
GFR SERPL CREATININE-BSD FRML MDRD: 52 ML/MIN/1.73SQ M
GLUCOSE SERPL-MCNC: 98 MG/DL (ref 65–140)
HCT VFR BLD AUTO: 38 % (ref 36.5–49.3)
HGB BLD-MCNC: 13.1 G/DL (ref 12–17)
MCH RBC QN AUTO: 33 PG (ref 26.8–34.3)
MCHC RBC AUTO-ENTMCNC: 34.5 G/DL (ref 31.4–37.4)
MCV RBC AUTO: 96 FL (ref 82–98)
PLATELET # BLD AUTO: 136 THOUSANDS/UL (ref 149–390)
PMV BLD AUTO: 10.2 FL (ref 8.9–12.7)
POTASSIUM SERPL-SCNC: 4.2 MMOL/L (ref 3.5–5.3)
RBC # BLD AUTO: 3.97 MILLION/UL (ref 3.88–5.62)
SODIUM SERPL-SCNC: 141 MMOL/L (ref 136–145)
WBC # BLD AUTO: 5.68 THOUSAND/UL (ref 4.31–10.16)

## 2019-10-02 PROCEDURE — 99239 HOSP IP/OBS DSCHRG MGMT >30: CPT | Performed by: NURSE PRACTITIONER

## 2019-10-02 PROCEDURE — 85027 COMPLETE CBC AUTOMATED: CPT | Performed by: NURSE PRACTITIONER

## 2019-10-02 PROCEDURE — 80048 BASIC METABOLIC PNL TOTAL CA: CPT | Performed by: NURSE PRACTITIONER

## 2019-10-02 PROCEDURE — 99232 SBSQ HOSP IP/OBS MODERATE 35: CPT | Performed by: INTERNAL MEDICINE

## 2019-10-02 RX ORDER — LISINOPRIL 40 MG/1
40 TABLET ORAL DAILY
Refills: 0 | Status: SHIPPED | OUTPATIENT
Start: 2019-10-04

## 2019-10-02 RX ADMIN — LORATADINE 10 MG: 10 TABLET ORAL at 08:40

## 2019-10-02 RX ADMIN — PANTOPRAZOLE SODIUM 40 MG: 40 TABLET, DELAYED RELEASE ORAL at 05:40

## 2019-10-02 RX ADMIN — BUDESONIDE AND FORMOTEROL FUMARATE DIHYDRATE 2 PUFF: 160; 4.5 AEROSOL RESPIRATORY (INHALATION) at 08:40

## 2019-10-02 RX ADMIN — APIXABAN 5 MG: 5 TABLET, FILM COATED ORAL at 08:40

## 2019-10-02 RX ADMIN — LEVOTHYROXINE SODIUM 112 MCG: 112 TABLET ORAL at 05:40

## 2019-10-02 RX ADMIN — CARVEDILOL 6.25 MG: 6.25 TABLET, FILM COATED ORAL at 08:40

## 2019-10-02 NOTE — PROGRESS NOTES
Cardiology Progress Note - Heidy Tirado 78 y o  male MRN: [id number]    Unit/Bed#: CW2 211-01 Encounter: [id number]      Assessment:  Principal Problem:    Pre-syncope  Active Problems:    Chronic diastolic heart failure (HCC)    Apical variant hypertrophic cardiomyopathy (HCC)    Hypothyroidism    JUANI (obstructive sleep apnea)    Paroxysmal atrial fibrillation (HCC)    Sick sinus syndrome (HCC)    Acute renal failure superimposed on stage 3 chronic kidney disease (HCC)    Thrombocytopenia ([address] )      Plan:  Patient is comfortable this morning  He has no chest pain or significant dyspnea  He is on intravenous fluids  BMP today with potassium of 4 2 and creatinine of 1 36  No evidence of orthostasis based on blood pressure determination this morning  He is stable from a cardiac perspective  He will follow up with his primary cardiologist in the [facility] system as an outpatient  Subjective:   Patient seen and examined  No significant events overnight   negative  Objective:     Vitals: Blood pressure 129/79, pulse 80, temperature 98 4 °F (36 9 °C), resp  rate 19, height 5' 10" (1 778 m), weight 102 kg (224 lb 9 6 oz), SpO2 98 %  , Body mass index is 32 23 kg/m² ,   Orthostatic Blood Pressures      Most Recent Value   Blood Pressure  129/79 filed at 10/02/2019 0704   Patient Position - Orthostatic VS  Standing - Orthostatic VS filed at 10/01/2019 1240      ,      Intake/Output Summary (Last 24 hours) at 10/2/2019 5806  Last data filed at 10/2/2019 0830  Gross per 24 hour   Intake 1185 ml   Output 2225 ml   Net -1040 ml           Physical Exam:    GEN: Wilder Tirado appears well, alert and oriented x 3, pleasant and cooperative   NECK: supple, no carotid bruits, no JVD or HJR  HEART: normal rate, regular rhythm, normal S1 and S2, no murmurs, clicks, gallops or rubs   LUNGS: clear to auscultation bilaterally; no wheezes, rales, or rhonchi   ABDOMEN: normal bowel sounds, soft, no tenderness, no distention  EXTREMITIES: peripheral pulses normal; no clubbing, cyanosis, or edema  SKIN: warm and well perfused, no suspicious lesions on exposed skin    Labs & Results:    Admission on 09/30/2019   Component Date Value    WBC 09/30/2019 10 71*    RBC 09/30/2019 4 15     Hemoglobin 09/30/2019 13 6     Hematocrit 09/30/2019 38 9     MCV 09/30/2019 94     MCH 09/30/2019 32 8     MCHC 09/30/2019 35 0     RDW 09/30/2019 12 7     MPV 09/30/2019 10 2     Platelets 70/62/6744 129*    nRBC 09/30/2019 0     Sodium 09/30/2019 141     Potassium 09/30/2019 3 1*    Chloride 09/30/2019 111*    CO2 09/30/2019 23     ANION GAP 09/30/2019 7     BUN 09/30/2019 17     Creatinine 09/30/2019 1 11     Glucose 09/30/2019 74     Calcium 09/30/2019 8 0*    eGFR 09/30/2019 67     Troponin I 09/30/2019 <0 02     Segmented % 09/30/2019 91*    Bands % 09/30/2019 3     Lymphocytes % 09/30/2019 2*    Monocytes % 09/30/2019 3*    Eosinophils, % 09/30/2019 1     Basophils % 09/30/2019 0     Absolute Neutrophils 09/30/2019 10 07*    Lymphocytes Absolute 09/30/2019 0 21*    Monocytes Absolute 09/30/2019 0 32     Eosinophils Absolute 09/30/2019 0 11     Basophils Absolute 09/30/2019 0 00     Total Counted 09/30/2019 100     Anisocytosis 09/30/2019 Present     Platelet Estimate 82/29/0027 Decreased*    Magnesium 09/30/2019 1 6     Troponin I 10/01/2019 <0 02     Troponin I 10/01/2019 <0 02     Sodium 10/01/2019 137     Potassium 10/01/2019 4 0     Chloride 10/01/2019 106     CO2 10/01/2019 26     ANION GAP 10/01/2019 5     BUN 10/01/2019 22     Creatinine 10/01/2019 1 69*    Glucose 10/01/2019 106     Calcium 10/01/2019 8 9     eGFR 10/01/2019 40     Magnesium 10/01/2019 2 8*    TSH 3RD GENERATON 10/01/2019 0 780     Ventricular Rate 10/01/2019 75     Atrial Rate 10/01/2019 75     WI Interval 10/01/2019 234     QRSD Interval 10/01/2019 98     QT Interval 10/01/2019 396     QTC Interval 10/01/2019 442     P Axis 10/01/2019 -9     QRS Axis 10/01/2019 -4     T Wave Axis 10/01/2019 131     Sodium 10/01/2019 138     Potassium 10/01/2019 4 1     Chloride 10/01/2019 106     CO2 10/01/2019 25     ANION GAP 10/01/2019 7     BUN 10/01/2019 23     Creatinine 10/01/2019 1 82*    Glucose 10/01/2019 102     Calcium 10/01/2019 8 9     eGFR 10/01/2019 37     Ventricular Rate 09/30/2019 80     Atrial Rate 09/30/2019 80     NM Interval 09/30/2019 228     QRSD Interval 09/30/2019 100     QT Interval 09/30/2019 350     QTC Interval 09/30/2019 403     P Axis 09/30/2019 84     QRS Axis 09/30/2019 -11     T Wave Axis 09/30/2019 176     Ventricular Rate 09/30/2019 80     Atrial Rate 09/30/2019 80     NM Interval 09/30/2019 240     QRSD Interval 09/30/2019 102     QT Interval 09/30/2019 342     QTC Interval 09/30/2019 394     P Axis 09/30/2019 47     QRS Axis 09/30/2019 -15     T Wave Axis 09/30/2019 179     Color, UA 10/01/2019 Yellow     Clarity, UA 10/01/2019 Clear     Specific Gravity, UA 10/01/2019 1 004     pH, UA 10/01/2019 7 0     Leukocytes, UA 10/01/2019 Negative     Nitrite, UA 10/01/2019 Negative     Protein, UA 10/01/2019 Negative     Glucose, UA 10/01/2019 Negative     Ketones, UA 10/01/2019 Negative     Urobilinogen, UA 10/01/2019 1 0     Bilirubin, UA 10/01/2019 Negative     Blood, UA 10/01/2019 Negative     Sodium 10/02/2019 141     Potassium 10/02/2019 4 2     Chloride 10/02/2019 111*    CO2 10/02/2019 23     ANION GAP 10/02/2019 7     BUN 10/02/2019 18     Creatinine 10/02/2019 1 36*    Glucose 10/02/2019 98     Calcium 10/02/2019 8 8     eGFR 10/02/2019 52     WBC 10/02/2019 5 68     RBC 10/02/2019 3 97     Hemoglobin 10/02/2019 13 1     Hematocrit 10/02/2019 38 0     MCV 10/02/2019 96     MCH 10/02/2019 33 0     MCHC 10/02/2019 34 5     RDW 10/02/2019 12 9     Platelets 07/83/5266 136*    MPV 10/02/2019 10 2        Xr Chest 2 Views    Result Date: 10/1/2019  Narrative: CHEST INDICATION:   Cardiac evaluation  COMPARISON:  None EXAM PERFORMED/VIEWS:  XR CHEST PA & LATERAL  The frontal view was performed utilizing dual energy radiographic technique  FINDINGS:  Left-sided AICD/pacemaker noted, leads intact  Cardiomediastinal silhouette appears unremarkable  The lungs are clear  No pneumothorax or pleural effusion  Osseous structures appear within normal limits for patient age  Impression: Left-sided AICD/pacemaker  No active pulmonary disease  Workstation performed: [id number]     Ct Head Without Contrast    Result Date: 9/30/2019  Narrative: CT BRAIN - WITHOUT CONTRAST INDICATION:   Dizziness  COMPARISON:  None  TECHNIQUE:  CT examination of the brain was performed  In addition to axial images, coronal 2D reformatted images were created and submitted for interpretation  Radiation dose length product (DLP) for this visit:  1007 58 mGy-cm   This examination, like all CT scans performed in the [facility], was performed utilizing techniques to minimize radiation dose exposure, including the use of iterative reconstruction and automated exposure control  IMAGE QUALITY:  Diagnostic  FINDINGS: PARENCHYMA:  No intracranial mass, mass effect or midline shift  No CT signs of acute infarction  No acute parenchymal hemorrhage  VENTRICLES AND EXTRA-AXIAL SPACES:  There is mild bifrontal volume loss  VISUALIZED ORBITS AND PARANASAL SINUSES:  Unremarkable  CALVARIUM AND EXTRACRANIAL SOFT TISSUES:  Normal      Impression: No acute intracranial abnormality  Workstation performed: [id number]     Us Retroperitoneal Complete    Result Date: 10/2/2019  Narrative: RENAL ULTRASOUND INDICATION:   RENAL FAILURE, ACUTE  COMPARISON: None TECHNIQUE:   Ultrasound of the retroperitoneum was performed with a curvilinear transducer utilizing volumetric sweeps and still imaging techniques  FINDINGS: KIDNEYS: Symmetric and normal size   Right kidney:  11 8 x 5 1 cm  Left kidney:  12 4 x 6 2 cm  Right kidney Normal echogenicity and contour  No suspicious masses detected  1 9 cm interpolar exophytic simple cyst  No hydronephrosis  No shadowing calculi  No perinephric fluid collections  Left kidney Normal echogenicity and contour  No suspicious masses detected  No hydronephrosis  No shadowing calculi  No perinephric fluid collections  URETERS: Nonvisualized  BLADDER: Normally distended  No focal thickening or mass lesions  Bilateral ureteral jets detected  Impression: 1 9 cm right renal simple cyst  Remainder of the examination is normal   Workstation performed: [id number]       EKG personally reviewed by Robert Caballero MD      Counseling / Coordination of Care  Total floor / unit time spent today 30 minutes  Greater than 50% of total time was spent with the patient and / or family counseling and / or coordination of care

## 2019-10-02 NOTE — DISCHARGE SUMMARY
Discharge- Renea Sterling 1950, 78 y o  male MRN: [id number]    Unit/Bed#: CW2 211-01 Encounter: [id number]    Primary Care Provider: Hernan Russell MD   Date and time admitted to hospital: 9/30/2019  6:34 PM        * Pre-syncope  Assessment & Plan  · Presenting from [facility] with several episodes of dizziness with apparent reported near syncope  · Has been asymptomatic since admission  · Suspect symptoms are secondary to volume depletion/hypoglycemia (blood sugar 74 on admission)  · Patient states he only drank 1 cup of coffee all day  · Patient also admits to primarily drinking beverages that are caffeinated throughout the day  · EKG nonischemic but with multiple T-wave inversions; no prior EKGs available for comparison  · Trop x3 negative   · Additionally ICD interrogation significant for 8 episodes of NSVT which did not trigger shock  · Patient recently switched off metoprolol to Coreg approximately 10 days prior to this admission  · Orthostatic negative  · [address] Cardiology consultation, no further cardiac workup recommended at this time  Patient should follow up with his established cardiologist after discharge  Acute renal failure superimposed on stage 3 chronic kidney disease ([facility] [address] )  Assessment & Plan  · Present on admission, improved with holding of lisinopril and IV hydration  · Creatinine improved to 1 36 (down from 1 82)  · Baseline appears to be 1 1-1 3  · Likely prerenal secondary to dehydration  · Advised to hold lisinopril another day resume on Friday October 4th    · Avoid nephrotoxic drugs and hypotension     Paroxysmal atrial fibrillation (HCC)  Assessment & Plan  · S/p AICD  · Continue beta-blocker  · Anticoagulated on Eliquis    JUANI (obstructive sleep apnea)  Assessment & Plan  · Continue nightly CPAP    Hypothyroidism  Assessment & Plan  · TSH 0 780  · Continue Synthroid     Apical variant hypertrophic cardiomyopathy [facility])  Assessment & Plan  · Echocardiogram 08/2019 again showing apical hypertrophy  · S/P ICD  · Follows with [facility]    Chronic diastolic heart failure ([address] )  Assessment & Plan  Wt Readings from Last 3 Encounters:   10/01/19 102 kg (224 lb 9 6 oz)   09/05/19 104 kg (230 lb)   06/18/19 99 8 kg (220 lb)     · Appears euvolemic to examination  · Weight is stable  · Continue beta-blocker          Discharging Physician / Practitioner: DILLAN Olivares  PCP: Sonya Azevedo MD  Admission Date:   Admission Orders (From admission, onward)     Ordered        10/01/19 1515  Inpatient Admission  Once         09/30/19 2210  Place in Observation (expected length of stay for this patient is less than two midnights)  Once                   Discharge Date: 10/02/19    Resolved Problems  Date Reviewed: 10/2/2019          Resolved    Hypokalemia 10/1/2019     Resolved by  DILLAN Jensen          Consultations During Hospital Stay:  · cardiology    Procedures Performed:   ·     Significant Findings / Test Results:   Xr Chest 2 Views  Result Date: 10/1/2019  Narrative: CHEST INDICATION:   Cardiac evaluation  COMPARISON:  None EXAM PERFORMED/VIEWS:  XR CHEST PA & LATERAL  The frontal view was performed utilizing dual energy radiographic technique  FINDINGS:  Left-sided AICD/pacemaker noted, leads intact  Cardiomediastinal silhouette appears unremarkable  The lungs are clear  No pneumothorax or pleural effusion  Osseous structures appear within normal limits for patient age  Impression: Left-sided AICD/pacemaker  No active pulmonary disease  Workstation performed: [id number]       Ct Head Without Contrast  Result Date: 9/30/2019  Narrative: CT BRAIN - WITHOUT CONTRAST INDICATION:   Dizziness  COMPARISON:  None  TECHNIQUE:  CT examination of the brain was performed  In addition to axial images, coronal 2D reformatted images were created and submitted for interpretation  Radiation dose length product (DLP) for this visit:  1007 58 mGy-cm     This examination, like all CT scans performed in the [facility], was performed utilizing techniques to minimize radiation dose exposure, including the use of iterative reconstruction and automated exposure control  IMAGE QUALITY:  Diagnostic  FINDINGS: PARENCHYMA:  No intracranial mass, mass effect or midline shift  No CT signs of acute infarction  No acute parenchymal hemorrhage  VENTRICLES AND EXTRA-AXIAL SPACES:  There is mild bifrontal volume loss  VISUALIZED ORBITS AND PARANASAL SINUSES:  Unremarkable  CALVARIUM AND EXTRACRANIAL SOFT TISSUES:  Normal    Impression: No acute intracranial abnormality  Workstation performed: [id number]       Us Retroperitoneal Complete  Result Date: 10/2/2019  Narrative: RENAL ULTRASOUND INDICATION:   RENAL FAILURE, ACUTE  COMPARISON: None TECHNIQUE:   Ultrasound of the retroperitoneum was performed with a curvilinear transducer utilizing volumetric sweeps and still imaging techniques  FINDINGS: KIDNEYS: Symmetric and normal size  Right kidney:  11 8 x 5 1 cm  Left kidney:  12 4 x 6 2 cm  Right kidney Normal echogenicity and contour  No suspicious masses detected  1 9 cm interpolar exophytic simple cyst  No hydronephrosis  No shadowing calculi  No perinephric fluid collections  Left kidney Normal echogenicity and contour  No suspicious masses detected  No hydronephrosis  No shadowing calculi  No perinephric fluid collections  URETERS: Nonvisualized  BLADDER: Normally distended  No focal thickening or mass lesions  Bilateral ureteral jets detected     · Impression: 1 9 cm right renal simple cyst  Remainder of the examination is normal   Workstation performed: [id number]     Incidental Findings:   · none    Test Results Pending at Discharge (will require follow up):   ·      Outpatient Tests Requested:  · BMP in 1-2 weeks to follow up LAKISHA    Complications:  none    Reason for Admission: dizziness and near syncope    Hospital Course:     Hattie Peraza is a 78 y o  male patient who originally presented to the hospital on 9/30/2019 due to dizziness and near syncope  He has a past medical history significant for apical HOCM s/p AICD, sick sinus syndrome, atrial fibrillation anticoagulated on Eliquis, hypothyroidism, hypertension, hyperlipidemia, JUANI on nightly CPAP  Felt lightheaded and dizzy and needed to sit down which helped his sx  He had another reoccurrence while sitting  He had no LOC  He presented to the ER and had another episode  He denied additional sx and reported he only drank coffee all day  He was admitted for observation  Cardiology was consulted  ICD was interrogated  Troponin negative x 3  UA clear and TSH WNL  EKG SR with 1degree AV block  Orthostatic BP check was negative  He was cleared by cardiology and no further testing was recommended  He had mild LAKISHA of which ACEi was was held and IVF were given  This was thought to be secondary to dehydration  He was considered medically stable and discharged home with OP follow up with established cardiologist            Please see above list of diagnoses and related plan for additional information  Condition at Discharge: stable     Discharge Day Visit / Exam:     Subjective:  Denies HA, dizziness, no N/V  No CP or SOB  Vitals: Blood Pressure: 129/79 (10/02/19 0704)  Pulse: 80 (10/02/19 0704)  Temperature: 98 4 °F (36 9 °C) (10/01/19 2240)  Temp Source: Oral (10/01/19 0040)  Respirations: 19 (10/02/19 0331)  Height: 5' 10" (177 8 cm) (10/01/19 0040)  Weight - Scale: 102 kg (224 lb 9 6 oz) (10/01/19 0040)  SpO2: 98 % (10/02/19 0704)  Exam:   Physical Exam    Discussion with Family: n/a    Discharge instructions/Information to patient and family:   See after visit summary for information provided to patient and family  Provisions for Follow-Up Care:  See after visit summary for information related to follow-up care and any pertinent home health orders        Disposition:     Home    For Discharges to [facility]: · Not Applicable to this Patient - Not Applicable to this Patient    Planned Readmission: no     Discharge Statement:  I spent 40 minutes discharging the patient  This time was spent on the day of discharge  I had direct contact with the patient on the day of discharge  Greater than 50% of the total time was spent examining patient, answering all patient questions, arranging and discussing plan of care with patient as well as directly providing post-discharge instructions  Additional time then spent on discharge activities  Discharge Medications:  See after visit summary for reconciled discharge medications provided to patient and family        ** Please Note: This note has been constructed using a voice recognition system **

## 2019-10-02 NOTE — ASSESSMENT & PLAN NOTE
· Presenting from [facility] with several episodes of dizziness with apparent reported near syncope  · Has been asymptomatic since admission  · Suspect symptoms are secondary to volume depletion/hypoglycemia (blood sugar 74 on admission)  · Patient states he only drank 1 cup of coffee all day  · Patient also admits to primarily drinking beverages that are caffeinated throughout the day  · EKG nonischemic but with multiple T-wave inversions; no prior EKGs available for comparison  · Trop x3 negative   · Additionally ICD interrogation significant for 8 episodes of NSVT which did not trigger shock  · Patient recently switched off metoprolol to Coreg approximately 10 days prior to this admission  · Orthostatic negative  · [address] Cardiology consultation, no further cardiac workup recommended at this time  Patient should follow up with his established cardiologist after discharge

## 2019-10-02 NOTE — ASSESSMENT & PLAN NOTE
Wt Readings from Last 3 Encounters:   10/01/19 102 kg (224 lb 9 6 oz)   09/05/19 104 kg (230 lb)   06/18/19 99 8 kg (220 lb)     · Appears euvolemic to examination  · Weight is stable  · Continue beta-blocker

## 2019-10-02 NOTE — ASSESSMENT & PLAN NOTE
· Present on admission, improved with holding of lisinopril and IV hydration  · Creatinine improved to 1 36 (down from 1 82)  · Baseline appears to be 1 1-1 3  · Likely prerenal secondary to dehydration  · Advised to hold lisinopril another day resume on Friday October 4th    · Avoid nephrotoxic drugs and hypotension

## 2019-10-02 NOTE — DISCHARGE INSTR - AVS FIRST PAGE
Recommend to please drink more water and less caffeinated beverages    Hold lisinopril until Friday    Follow-up with your established cardiologist

## 2020-01-09 ENCOUNTER — APPOINTMENT (OUTPATIENT)
Dept: RADIOLOGY | Facility: CLINIC | Age: 70
End: 2020-01-09
Payer: MEDICARE

## 2020-01-09 ENCOUNTER — OFFICE VISIT (OUTPATIENT)
Dept: URGENT CARE | Facility: CLINIC | Age: 70
End: 2020-01-09
Payer: MEDICARE

## 2020-01-09 DIAGNOSIS — M79.671 RIGHT FOOT PAIN: ICD-10-CM

## 2020-01-09 DIAGNOSIS — M79.671 RIGHT FOOT PAIN: Primary | ICD-10-CM

## 2020-01-09 PROCEDURE — G0463 HOSPITAL OUTPT CLINIC VISIT: HCPCS | Performed by: NURSE PRACTITIONER

## 2020-01-09 PROCEDURE — 73630 X-RAY EXAM OF FOOT: CPT

## 2020-01-09 PROCEDURE — 99213 OFFICE O/P EST LOW 20 MIN: CPT | Performed by: NURSE PRACTITIONER

## 2020-01-09 NOTE — PATIENT INSTRUCTIONS
No acute abnormality on x-ray  There is some arthritis and bone spur noted on xray  Rest   Ice every 3-4 hours for 20 minutes  Tylenol arthritis as needed for pain  Use a good fitted shoe  Follow up with Podiatry for evaluation

## 2020-01-09 NOTE — PROGRESS NOTES
[address] [facility]        NAME: Arnoldo Lucas is a 78 y o  male  : 1950    MRN: [id number]  DATE: 2020  TIME: 11:47 AM    Assessment and Plan   Right foot pain [M79 [id number]]  1  Right foot pain  XR foot 3+ vw right    Ambulatory referral to Podiatry    CANCELED: XR foot 3+ vw right         Patient Instructions     Patient Instructions   No acute abnormality on x-ray  There is some arthritis and bone spur noted on xray  Rest   Ice every 3-4 hours for 20 minutes  Tylenol arthritis as needed for pain  Use a good fitted shoe  Follow up with Podiatry for evaluation  Chief Complaint   No chief complaint on file  History of Present Illness   Claire Tirado presents to the clinic c/o    This is a 78year old male here today with right foot pain x 4 days  No injury or trama  He stats his foot has been tender and swollen  He noticed some redness yesterday  He  applied icey hot cream for pain  Pain is located over the lateral aspect of MTP joint  He denies any foot pain in the past    Petr Burris but did not help with foot  No history of diabetes  Review of Systems   Review of Systems   Constitutional: Negative  Respiratory: Negative  Cardiovascular: Negative  Musculoskeletal: Positive for arthralgias  Skin: Negative  Neurological: Negative  Psychiatric/Behavioral: Negative            Current Medications     Long-Term Medications   Medication Sig Dispense Refill    carvedilol (COREG) 6 25 mg tablet Take 6 25 mg by mouth daily      ELIQUIS 5 MG Take 5 mg by mouth 2 (two) times a day       esomeprazole (NexIUM) 40 MG capsule Take 40 mg by mouth every morning before breakfast      ibuprofen (MOTRIN) 200 mg tablet Take by mouth every 6 (six) hours as needed for mild pain      levothyroxine 112 mcg tablet Take 112 mcg by mouth daily       lisinopril (ZESTRIL) 40 mg tablet Take 1 tablet (40 mg total) by mouth daily  0    loratadine (CLARITIN) 10 mg tablet Take 10 mg by mouth daily      simvastatin (ZOCOR) 20 mg tablet Take 20 mg by mouth daily at bedtime       SYMBICORT 160-4 5 MCG/ACT inhaler Inhale 2 puffs 2 (two) times a day       travoprost (TRAVATAN-Z) 0 004 % ophthalmic solution 1 drop daily at bedtime         Current Allergies     Allergies as of 01/09/2020 - Reviewed 09/30/2019   Allergen Reaction Noted    Procardia [nifedipine] Palpitations 03/25/2019            The following portions of the patient's history were reviewed and updated as appropriate: allergies, current medications, past family history, past medical history, past social history, past surgical history and problem list     Objective   There were no vitals taken for this visit  Physical Exam     Physical Exam   Constitutional: He is oriented to person, place, and time  He appears well-developed and well-nourished  Cardiovascular: Normal rate and regular rhythm  Pulmonary/Chest: Effort normal and breath sounds normal    Musculoskeletal:   Right foot: TTP over the lateral aspect of the MTP joint  Full ROM , good strength  No swelling, redness or bruising  Neurological: He is alert and oriented to person, place, and time  Psychiatric: He has a normal mood and affect  His behavior is normal    Nursing note and vitals reviewed  Right foot xray:  Bone spur noted and possible arthritis over the MTP joint of the big toe  Laurie Console

## 2020-03-13 ENCOUNTER — APPOINTMENT (OUTPATIENT)
Dept: RADIOLOGY | Facility: CLINIC | Age: 70
End: 2020-03-13
Payer: MEDICARE

## 2020-03-13 ENCOUNTER — TELEPHONE (OUTPATIENT)
Dept: URGENT CARE | Facility: CLINIC | Age: 70
End: 2020-03-13

## 2020-03-13 ENCOUNTER — OFFICE VISIT (OUTPATIENT)
Dept: URGENT CARE | Facility: CLINIC | Age: 70
End: 2020-03-13
Payer: MEDICARE

## 2020-03-13 VITALS
HEART RATE: 80 BPM | WEIGHT: 217 LBS | TEMPERATURE: 98 F | BODY MASS INDEX: 31.07 KG/M2 | HEIGHT: 70 IN | SYSTOLIC BLOOD PRESSURE: 137 MMHG | OXYGEN SATURATION: 98 % | DIASTOLIC BLOOD PRESSURE: 92 MMHG | RESPIRATION RATE: 18 BRPM

## 2020-03-13 DIAGNOSIS — R05.9 COUGH: ICD-10-CM

## 2020-03-13 DIAGNOSIS — J20.9 ACUTE BRONCHITIS, UNSPECIFIED ORGANISM: Primary | ICD-10-CM

## 2020-03-13 PROCEDURE — 71046 X-RAY EXAM CHEST 2 VIEWS: CPT

## 2020-03-13 PROCEDURE — G0463 HOSPITAL OUTPT CLINIC VISIT: HCPCS | Performed by: NURSE PRACTITIONER

## 2020-03-13 PROCEDURE — 99213 OFFICE O/P EST LOW 20 MIN: CPT | Performed by: NURSE PRACTITIONER

## 2020-03-13 RX ORDER — BENZONATATE 100 MG/1
100 CAPSULE ORAL 3 TIMES DAILY PRN
Qty: 20 CAPSULE | Refills: 0 | Status: SHIPPED | OUTPATIENT
Start: 2020-03-13 | End: 2021-12-29

## 2020-03-13 RX ORDER — AZITHROMYCIN 250 MG/1
TABLET, FILM COATED ORAL
Qty: 6 TABLET | Refills: 0 | Status: SHIPPED | OUTPATIENT
Start: 2020-03-13 | End: 2020-03-17

## 2020-03-13 NOTE — PATIENT INSTRUCTIONS
Take medication as directed  Rest and drink plenty of fluids  A cool mist humidifier can be helpful  If you develop a worsening cough, chest pain, shortness of breath, palpitations, coughing up blood, prolonged high fever, any new or concerning symptoms please return or proceed ER  Recommend following up with PCP in 3-5 days      Acute Bronchitis   WHAT YOU NEED TO KNOW:   Acute bronchitis is swelling and irritation in the air passages of your lungs  This irritation may cause you to cough or have other breathing problems  Acute bronchitis often starts because of another illness, such as a cold or the flu  The illness spreads from your nose and throat to your windpipe and airways  Bronchitis is often called a chest cold  Acute bronchitis lasts about 3 to 6 weeks and is usually not a serious illness  Your cough can last for several weeks  DISCHARGE INSTRUCTIONS:   Return to the emergency department if:   · You cough up blood  · Your lips or fingernails turn blue  · You feel like you are not getting enough air when you breathe  Contact your healthcare provider if:   · You have a fever  · Your breathing problems do not go away or get worse  · Your cough does not get better within 4 weeks  · You have questions or concerns about your condition or care  Self-care:   · Get more rest   Rest helps your body to heal  Slowly start to do more each day  Rest when you feel it is needed  · Avoid irritants in the air  Avoid chemicals, fumes, and dust  Wear a face mask if you must work around dust or fumes  Stay inside on days when air pollution levels are high  If you have allergies, stay inside when pollen counts are high  Do not use aerosol products, such as spray-on deodorant, bug spray, and hair spray  · Do not smoke or be around others who smoke  Nicotine and other chemicals in cigarettes and cigars damages the cilia that move mucus out of your lungs   Ask your healthcare provider for information if you currently smoke and need help to quit  E-cigarettes or smokeless tobacco still contain nicotine  Talk to your healthcare provider before you use these products  · Drink liquids as directed  Liquids help keep your air passages moist and help you cough up mucus  You may need to drink more liquids when you have acute bronchitis  Ask how much liquid to drink each day and which liquids are best for you  · Use a humidifier or vaporizer  Use a cool mist humidifier or a vaporizer to increase air moisture in your home  This may make it easier for you to breathe and help decrease your cough  Decrease risk for acute bronchitis:   · Get the vaccinations you need  Ask your healthcare provider if you should get vaccinated against the flu or pneumonia  · Prevent the spread of germs  You can decrease your risk of acute bronchitis and other illnesses by doing the following:     [facility] your hands often with soap and water  Carry germ-killing hand lotion or gel with you  You can use the lotion or gel to clean your hands when soap and water are not available  ¨ Do not touch your eyes, nose, or mouth unless you have washed your hands first     ¨ Always cover your mouth when you cough to prevent the spread of germs  It is best to cough into a tissue or your shirt sleeve instead of into your hand  Ask those around you cover their mouths when they cough  ¨ Try to avoid people who have a cold or the flu  If you are sick, stay away from others as much as possible  Medicines: Your healthcare provider may  give you any of the following:  · Ibuprofen or acetaminophen  are medicines that help lower your fever  They are available without a doctor's order  Ask your healthcare provider which medicine is right for you  Ask how much to take and how often to take it  Follow directions  These medicines can cause stomach bleeding if not taken correctly  Ibuprofen can cause kidney damage   Do not take ibuprofen if you have kidney disease, an ulcer, or allergies to aspirin  Acetaminophen can cause liver damage  Do not take more than 4,000 milligrams in 24 hours  · Decongestants  help loosen mucus in your lungs and make it easier to cough up  This can help you breathe easier  · Cough suppressants  decrease your urge to cough  If your cough produces mucus, do not take a cough suppressant unless your healthcare provider tells you to  Your healthcare provider may suggest that you take a cough suppressant at night so you can rest     · Inhalers  may be given  Your healthcare provider may give you one or more inhalers to help you breathe easier and cough less  An inhaler gives your medicine to open your airways  Ask your healthcare provider to show you how to use your inhaler correctly  · Take your medicine as directed  Contact your healthcare provider if you think your medicine is not helping or if you have side effects  Tell him of her if you are allergic to any medicine  Keep a list of the medicines, vitamins, and herbs you take  Include the amounts, and when and why you take them  Bring the list or the pill bottles to follow-up visits  Carry your medicine list with you in case of an emergency  Follow up with your healthcare provider as directed:  Write down questions you have so you will remember to ask them during your follow-up visits  © 2017 [address]  Information is for End User's use only and may not be sold, redistributed or otherwise used for commercial purposes  All illustrations and images included in CareNotes® are the copyrighted property of [employer]  or Tyler Currie  The above information is an  only  It is not intended as medical advice for individual conditions or treatments  Talk to your doctor, nurse or pharmacist before following any medical regimen to see if it is safe and effective for you

## 2020-03-13 NOTE — PROGRESS NOTES
[facility]        NAME: Nora Loera is a 78 y o  male  : 1950    MRN: [id number]  DATE: 2020  TIME: 1:38 PM    Assessment and Plan   Acute bronchitis, unspecified organism [J20 9]  1  Acute bronchitis, unspecified organism  XR chest pa & lateral    azithromycin (ZITHROMAX) 250 mg tablet    benzonatate (TESSALON PERLES) 100 mg capsule     No acute cardiopulmonary disease on xray per my read  Patient Instructions     Patient Instructions     Take medication as directed  Rest and drink plenty of fluids  A cool mist humidifier can be helpful  If you develop a worsening cough, chest pain, shortness of breath, palpitations, coughing up blood, prolonged high fever, any new or concerning symptoms please return or proceed ER  Recommend following up with PCP in 3-5 days      Acute Bronchitis   WHAT YOU NEED TO KNOW:   Acute bronchitis is swelling and irritation in the air passages of your lungs  This irritation may cause you to cough or have other breathing problems  Acute bronchitis often starts because of another illness, such as a cold or the flu  The illness spreads from your nose and throat to your windpipe and airways  Bronchitis is often called a chest cold  Acute bronchitis lasts about 3 to 6 weeks and is usually not a serious illness  Your cough can last for several weeks  DISCHARGE INSTRUCTIONS:   Return to the emergency department if:   · You cough up blood  · Your lips or fingernails turn blue  · You feel like you are not getting enough air when you breathe  Contact your healthcare provider if:   · You have a fever  · Your breathing problems do not go away or get worse  · Your cough does not get better within 4 weeks  · You have questions or concerns about your condition or care  Self-care:   · Get more rest   Rest helps your body to heal  Slowly start to do more each day  Rest when you feel it is needed  · Avoid irritants in the air    Avoid chemicals, fumes, and dust  Wear a face mask if you must work around dust or fumes  Stay inside on days when air pollution levels are high  If you have allergies, stay inside when pollen counts are high  Do not use aerosol products, such as spray-on deodorant, bug spray, and hair spray  · Do not smoke or be around others who smoke  Nicotine and other chemicals in cigarettes and cigars damages the cilia that move mucus out of your lungs  Ask your healthcare provider for information if you currently smoke and need help to quit  E-cigarettes or smokeless tobacco still contain nicotine  Talk to your healthcare provider before you use these products  · Drink liquids as directed  Liquids help keep your air passages moist and help you cough up mucus  You may need to drink more liquids when you have acute bronchitis  Ask how much liquid to drink each day and which liquids are best for you  · Use a humidifier or vaporizer  Use a cool mist humidifier or a vaporizer to increase air moisture in your home  This may make it easier for you to breathe and help decrease your cough  Decrease risk for acute bronchitis:   · Get the vaccinations you need  Ask your healthcare provider if you should get vaccinated against the flu or pneumonia  · Prevent the spread of germs  You can decrease your risk of acute bronchitis and other illnesses by doing the following:     [facility] your hands often with soap and water  Carry germ-killing hand lotion or gel with you  You can use the lotion or gel to clean your hands when soap and water are not available  ¨ Do not touch your eyes, nose, or mouth unless you have washed your hands first     ¨ Always cover your mouth when you cough to prevent the spread of germs  It is best to cough into a tissue or your shirt sleeve instead of into your hand  Ask those around you cover their mouths when they cough  ¨ Try to avoid people who have a cold or the flu   If you are sick, stay away from others as much as possible  Medicines: Your healthcare provider may  give you any of the following:  · Ibuprofen or acetaminophen  are medicines that help lower your fever  They are available without a doctor's order  Ask your healthcare provider which medicine is right for you  Ask how much to take and how often to take it  Follow directions  These medicines can cause stomach bleeding if not taken correctly  Ibuprofen can cause kidney damage  Do not take ibuprofen if you have kidney disease, an ulcer, or allergies to aspirin  Acetaminophen can cause liver damage  Do not take more than 4,000 milligrams in 24 hours  · Decongestants  help loosen mucus in your lungs and make it easier to cough up  This can help you breathe easier  · Cough suppressants  decrease your urge to cough  If your cough produces mucus, do not take a cough suppressant unless your healthcare provider tells you to  Your healthcare provider may suggest that you take a cough suppressant at night so you can rest     · Inhalers  may be given  Your healthcare provider may give you one or more inhalers to help you breathe easier and cough less  An inhaler gives your medicine to open your airways  Ask your healthcare provider to show you how to use your inhaler correctly  · Take your medicine as directed  Contact your healthcare provider if you think your medicine is not helping or if you have side effects  Tell him of her if you are allergic to any medicine  Keep a list of the medicines, vitamins, and herbs you take  Include the amounts, and when and why you take them  Bring the list or the pill bottles to follow-up visits  Carry your medicine list with you in case of an emergency  Follow up with your healthcare provider as directed:  Write down questions you have so you will remember to ask them during your follow-up visits    © 2017 [facility] Information is for End User's use only and may not be sold, redistributed or otherwise used for commercial purposes  All illustrations and images included in CareNotes® are the copyrighted property of [employer]  or Tyler Currie  The above information is an  only  It is not intended as medical advice for individual conditions or treatments  Talk to your doctor, nurse or pharmacist before following any medical regimen to see if it is safe and effective for you  Follow up with PCP in 3-5 days  Proceed to  ER if symptoms worsen  Chief Complaint     Chief Complaint   Patient presents with    Cold Like Symptoms     Patient c/o head congestion, SOB, and cough x 2 days  History of Present Illness       Patient is a 60-year-old male who presents with 3 day history of cough and congestion  Patient states the cough is occasionally productive with white sputum  Patient states that his wife has similar symptoms  Patient has been taking over-the-counter Coricidin with some relief  Patient denies any recent travel  Patient denies any fever, chills, or body aches  Patient denies any chest pain, shortness of breath, hemoptysis, or palpitations  Patient denies any headache, dizziness, or  feeling lightheaded  Patient denies any sinus pain or pressure, earache, or sore throat  Review of Systems   Review of Systems   Constitutional: Negative for chills, diaphoresis, fatigue and fever  HENT: Positive for congestion and rhinorrhea  Negative for ear discharge, ear pain, facial swelling, mouth sores, postnasal drip, sinus pressure, sinus pain, sore throat and trouble swallowing  Eyes: Negative for photophobia and visual disturbance  Respiratory: Positive for cough  Negative for chest tightness, shortness of breath, wheezing and stridor  Cardiovascular: Negative for chest pain and palpitations  Gastrointestinal: Negative for abdominal pain, diarrhea, nausea and vomiting  Genitourinary: Negative      Musculoskeletal: Negative for arthralgias, back pain, joint swelling, myalgias, neck pain and neck stiffness  Skin: Negative for rash  Neurological: Negative for dizziness, facial asymmetry, weakness, light-headedness, numbness and headaches           Current Medications       Current Outpatient Medications:     albuterol (PROVENTIL HFA,VENTOLIN HFA) 90 mcg/act inhaler, Inhale 2 puffs every 6 (six) hours as needed for wheezing, Disp: , Rfl:     carvedilol (COREG) 6 25 mg tablet, Take 6 25 mg by mouth daily, Disp: , Rfl:     COENZYME Q10-OMEGA 3 FATTY ACD PO, Take 2,000 mg by mouth daily, Disp: , Rfl:     ELIQUIS 5 MG, Take 5 mg by mouth 2 (two) times a day , Disp: , Rfl:     esomeprazole (NexIUM) 40 MG capsule, Take 40 mg by mouth every morning before breakfast, Disp: , Rfl:     Flaxseed, Linseed, (FLAXSEED OIL) 1000 MG CAPS, Take 2,000 mg by mouth daily, Disp: , Rfl:     ibuprofen (MOTRIN) 200 mg tablet, Take by mouth every 6 (six) hours as needed for mild pain, Disp: , Rfl:     levothyroxine 112 mcg tablet, Take 112 mcg by mouth daily , Disp: , Rfl:     lisinopril (ZESTRIL) 40 mg tablet, Take 1 tablet (40 mg total) by mouth daily, Disp: , Rfl: 0    loratadine (CLARITIN) 10 mg tablet, Take 10 mg by mouth daily, Disp: , Rfl:     simvastatin (ZOCOR) 20 mg tablet, Take 20 mg by mouth daily at bedtime , Disp: , Rfl:     SYMBICORT 160-4 5 MCG/ACT inhaler, Inhale 2 puffs 2 (two) times a day , Disp: , Rfl:     travoprost (TRAVATAN-Z) 0 004 % ophthalmic solution, 1 drop daily at bedtime, Disp: , Rfl:     azithromycin (ZITHROMAX) 250 mg tablet, Take 2 tablets today then 1 tablet daily x 4 days, Disp: 6 tablet, Rfl: 0    benzonatate (TESSALON PERLES) 100 mg capsule, Take 1 capsule (100 mg total) by mouth 3 (three) times a day as needed for cough, Disp: 20 capsule, Rfl: 0    Current Allergies     Allergies as of 03/13/2020 - Reviewed 03/13/2020   Allergen Reaction Noted    Procardia [nifedipine] Palpitations 03/25/2019            The following portions of the patient's history were reviewed and updated as appropriate: allergies, current medications, past family history, past medical history, past social history, past surgical history and problem list      Past Medical History:   Diagnosis Date    LAKISHA (acute kidney injury) ([address] ) 10/1/2019    Arthritis     Atrial fibrillation (HCC)     Chronic anticoagulation     Chronic kidney disease     Colon polyps     Coronary artery disease     CPAP (continuous positive airway pressure) dependence     bi pap    GERD (gastroesophageal reflux disease)     Hyperlipidemia     Hypertrophic cardiomegaly     Irregular heart beat     a fib    Pneumonia     Seasonal allergies     Sleep apnea        Past Surgical History:   Procedure Laterality Date    CARDIAC DEFIBRILLATOR PLACEMENT  2018    CATARACT EXTRACTION  2019    CATARACT EXTRACTION W/ INTRAOCULAR LENS IMPLANT Right 4/9/2019    Procedure: EXCHANGE OF IOL;  Surgeon: Tanvir Felder MD;  Location: [facility] MAIN OR;  Service: Ophthalmology    COLONOSCOPY      q 3 years    FRACTURE SURGERY  1969    INSERT / REPLACE / Michael Stack RMVL INSJ IO LENS PROSTH W/O ECP Right 3/26/2019    Procedure: PHACO W/IOL & LRI;  Surgeon: Tanvir Felder MD;  Location: [facility] MAIN OR;  Service: Ophthalmology    PA XCAPSL CTRC RMVL INSJ IO LENS PROSTH W/O ECP Left 5/7/2019    Procedure: DR LOW DELAROSA [facility] W/IOL & LRI;  Surgeon: Tanvir Felder MD;  Location: [facility] MAIN OR;  Service: Ophthalmology    TONSILLECTOMY         Family History   Problem Relation Age of Onset    Colon cancer Mother          Medications have been verified  Objective   /92   Pulse 80   Temp 98 °F (36 7 °C) (Tympanic)   Resp 18   Ht 5' 10" (1 778 m)   Wt 98 4 kg (217 lb)   SpO2 98%   BMI 31 14 kg/m²        Physical Exam     Physical Exam   Constitutional: He is oriented to person, place, and time  He appears well-developed and well-nourished  No distress     HENT:   Head: Normocephalic and atraumatic  Right Ear: Hearing, tympanic membrane, external ear and ear canal normal    Left Ear: Hearing, tympanic membrane, external ear and ear canal normal    Nose: Rhinorrhea present  No mucosal edema  Right sinus exhibits no maxillary sinus tenderness and no frontal sinus tenderness  Left sinus exhibits no maxillary sinus tenderness and no frontal sinus tenderness  Mouth/Throat: Uvula is midline, oropharynx is clear and moist and mucous membranes are normal  Tonsils are 0 on the right  Tonsils are 0 on the left  Cardiovascular: Normal rate, regular rhythm, S1 normal, S2 normal, normal heart sounds, intact distal pulses and normal pulses  Pulmonary/Chest: Effort normal  No accessory muscle usage  No tachypnea  No respiratory distress  He has rhonchi (mild) in the right lower field and the left lower field  Lymphadenopathy:     He has no cervical adenopathy  Neurological: He is alert and oriented to person, place, and time  GCS eye subscore is 4  GCS verbal subscore is 5  GCS motor subscore is 6  Skin: Skin is warm and dry  Capillary refill takes less than 2 seconds  He is not diaphoretic  Vitals reviewed

## 2020-11-07 ENCOUNTER — OFFICE VISIT (OUTPATIENT)
Dept: URGENT CARE | Facility: CLINIC | Age: 70
End: 2020-11-07
Payer: MEDICARE

## 2020-11-07 VITALS
OXYGEN SATURATION: 98 % | SYSTOLIC BLOOD PRESSURE: 136 MMHG | TEMPERATURE: 97.1 F | RESPIRATION RATE: 18 BRPM | HEART RATE: 79 BPM | DIASTOLIC BLOOD PRESSURE: 78 MMHG | HEIGHT: 70 IN | WEIGHT: 217 LBS | BODY MASS INDEX: 31.07 KG/M2

## 2020-11-07 DIAGNOSIS — J06.9 VIRAL UPPER RESPIRATORY TRACT INFECTION: Primary | ICD-10-CM

## 2020-11-07 PROCEDURE — 99213 OFFICE O/P EST LOW 20 MIN: CPT | Performed by: NURSE PRACTITIONER

## 2020-11-07 PROCEDURE — G0463 HOSPITAL OUTPT CLINIC VISIT: HCPCS | Performed by: NURSE PRACTITIONER

## 2020-11-07 RX ORDER — ZOLPIDEM TARTRATE 5 MG/1
TABLET ORAL
COMMUNITY
Start: 2020-10-07

## 2020-11-07 RX ORDER — BETAMETHASONE DIPROPIONATE 0.5 MG/G
CREAM TOPICAL 2 TIMES DAILY
COMMUNITY
Start: 2020-08-17 | End: 2021-12-29

## 2020-11-07 RX ORDER — FLUTICASONE PROPIONATE 50 MCG
2 SPRAY, SUSPENSION (ML) NASAL DAILY
Qty: 1 BOTTLE | Refills: 1 | Status: SHIPPED | OUTPATIENT
Start: 2020-11-07

## 2020-11-19 ENCOUNTER — OFFICE VISIT (OUTPATIENT)
Dept: URGENT CARE | Facility: CLINIC | Age: 70
End: 2020-11-19
Payer: MEDICARE

## 2020-11-19 ENCOUNTER — APPOINTMENT (OUTPATIENT)
Dept: RADIOLOGY | Facility: CLINIC | Age: 70
End: 2020-11-19
Payer: MEDICARE

## 2020-11-19 VITALS
HEIGHT: 70 IN | TEMPERATURE: 97.7 F | WEIGHT: 217 LBS | DIASTOLIC BLOOD PRESSURE: 87 MMHG | OXYGEN SATURATION: 97 % | SYSTOLIC BLOOD PRESSURE: 146 MMHG | HEART RATE: 72 BPM | BODY MASS INDEX: 31.07 KG/M2 | RESPIRATION RATE: 18 BRPM

## 2020-11-19 DIAGNOSIS — M79.641 RIGHT HAND PAIN: ICD-10-CM

## 2020-11-19 DIAGNOSIS — M25.531 RIGHT WRIST PAIN: ICD-10-CM

## 2020-11-19 DIAGNOSIS — M25.531 RIGHT WRIST PAIN: Primary | ICD-10-CM

## 2020-11-19 PROCEDURE — 73110 X-RAY EXAM OF WRIST: CPT

## 2020-11-19 PROCEDURE — 73130 X-RAY EXAM OF HAND: CPT

## 2020-11-19 PROCEDURE — G0463 HOSPITAL OUTPT CLINIC VISIT: HCPCS | Performed by: NURSE PRACTITIONER

## 2020-11-19 PROCEDURE — 99213 OFFICE O/P EST LOW 20 MIN: CPT | Performed by: NURSE PRACTITIONER

## 2021-01-07 ENCOUNTER — OFFICE VISIT (OUTPATIENT)
Dept: URGENT CARE | Facility: CLINIC | Age: 71
End: 2021-01-07
Payer: MEDICARE

## 2021-01-07 VITALS
BODY MASS INDEX: 31.07 KG/M2 | DIASTOLIC BLOOD PRESSURE: 74 MMHG | HEART RATE: 82 BPM | WEIGHT: 217 LBS | OXYGEN SATURATION: 98 % | SYSTOLIC BLOOD PRESSURE: 142 MMHG | TEMPERATURE: 97.6 F | HEIGHT: 70 IN | RESPIRATION RATE: 18 BRPM

## 2021-01-07 DIAGNOSIS — R20.0 NUMBNESS OF RIGHT FOOT: Primary | ICD-10-CM

## 2021-01-07 PROCEDURE — 99213 OFFICE O/P EST LOW 20 MIN: CPT | Performed by: PHYSICIAN ASSISTANT

## 2021-01-07 PROCEDURE — G0463 HOSPITAL OUTPT CLINIC VISIT: HCPCS | Performed by: PHYSICIAN ASSISTANT

## 2021-01-07 NOTE — PROGRESS NOTES
[address]          NAME: Rusty Coleman is a 69 y o  male  : 1950    MRN: [id number]  DATE: 2021  TIME: 3:17 PM    Assessment and Plan   Numbness of right foot [R20 0]  1  Numbness of right foot         Patient Instructions   Ice area as able  Discussed possible Pantoja's neuroma  Follow up with PCP in 2-3 days for further workup  Unable to recommend NSAIDS due to taking Plavix  Patient agreeable to plan  To present to the ER if symptoms worsen  Chief Complaint     Chief Complaint   Patient presents with    Numbness     right foot x 2 months         History of Present Illness   Omari Tirado presents to the clinic c/o    69year old male with history of arthritis, CAD, and A-fib presenting with 2-3 months of nonradiating, non-painful right foot numbness under the 3rd to 5th MTP joint and phalanges  He denies inciting trauma and states it has gradually worsened and is annoying with a feeling of numbness and paresthesias  He feels as though his sock is rolled up under his 3rd to 5th phalanges  He denies symptoms in the morning, but states that once he puts his shoes on and goes about his day, the feeling worsens  He denies urinary retention or incontinence  Denies constipation or bowel incontinence  Reports sensation to genitals and anus  Denies leg swelling  Denies history of diabetes  Allergic to procardia  Taking Eliquis  No surgies to the leg  Review of Systems   Review of Systems   Constitutional: Negative for chills, diaphoresis, fatigue and fever  Respiratory: Negative for shortness of breath  Cardiovascular: Negative for chest pain  Gastrointestinal: Negative for constipation, diarrhea, nausea and vomiting  Genitourinary: Negative for decreased urine volume, discharge, dysuria, frequency and urgency  Musculoskeletal: Negative for arthralgias, joint swelling and myalgias  Skin: Negative for color change and wound     Neurological: Positive for numbness (flexor surface of right foot inferior to 3rd - 5th phalanges)  Negative for weakness           Current Medications     Long-Term Medications   Medication Sig Dispense Refill    carvedilol (COREG) 6 25 mg tablet Take 6 25 mg by mouth daily      ELIQUIS 5 MG Take 5 mg by mouth 2 (two) times a day       esomeprazole (NexIUM) 40 MG capsule Take 40 mg by mouth every morning before breakfast      fluticasone (FLONASE) 50 mcg/act nasal spray 2 sprays into each nostril daily 1 Bottle 1    ibuprofen (MOTRIN) 200 mg tablet Take by mouth every 6 (six) hours as needed for mild pain      levothyroxine 112 mcg tablet Take 112 mcg by mouth daily       lisinopril (ZESTRIL) 40 mg tablet Take 1 tablet (40 mg total) by mouth daily  0    loratadine (CLARITIN) 10 mg tablet Take 10 mg by mouth daily      simvastatin (ZOCOR) 20 mg tablet Take 20 mg by mouth daily at bedtime       SYMBICORT 160-4 5 MCG/ACT inhaler Inhale 2 puffs 2 (two) times a day       travoprost (TRAVATAN-Z) 0 004 % ophthalmic solution 1 drop daily at bedtime      zolpidem (AMBIEN) 5 mg tablet       betamethasone dipropionate (DIPROSONE) 0 05 % cream Apply topically 2 (two) times a day         Current Allergies     Allergies as of 01/07/2021 - Reviewed 01/07/2021   Allergen Reaction Noted    Procardia [nifedipine] Palpitations 03/25/2019            The following portions of the patient's history were reviewed and updated as appropriate: allergies, current medications, past family history, past medical history, past social history, past surgical history and problem list   Past Medical History:   Diagnosis Date    [facility]AKISHA (acute kidney [facility]) ([facility] [address] ) 10/1/2019    Allergic rhinitis     Arthritis     Atrial fibrillation (HCC)     Chronic anticoagulation     Chronic kidney disease     Colon polyps     Coronary artery disease     CPAP (continuous positive airway pressure) dependence     bi pap    GERD (gastroesophageal reflux disease)     Hyperlipidemia     Hypertrophic cardiomegaly     Irregular heart beat     a fib    Pneumonia     Seasonal allergies     Sleep apnea      Past Surgical History:   Procedure Laterality Date    CARDIAC DEFIBRILLATOR PLACEMENT  2018    CATARACT EXTRACTION  2019    CATARACT EXTRACTION W/ INTRAOCULAR LENS IMPLANT Right 4/9/2019    Procedure: EXCHANGE OF IOL;  Surgeon: Tayo Uribe MD;  Location: [address] [place] [address];  Service: Ophthalmology    COLONOSCOPY      q 3 years    FRACTURE SURGERY  1969    INSERT / Jose Angel Gala / Loreda Aas W/O ECP Right 3/26/2019    Procedure: DR LOW DELAROSA [facility] W/IOL & LRI;  Surgeon: Tayo Uribe MD;  Location: [address] [place] [address];  Service: Ophthalmology    OK XCAPSL CTRC RMVL INSJ IO LENS PROSTH W/O ECP Left 5/7/2019    Procedure: DR LOW DELAROSA [facility] W/IOL & LRI;  Surgeon: Tayo Uribe MD;  Location: [facility];  Service: Ophthalmology    TONSILLECTOMY       Social History     Socioeconomic History    Marital status: /Civil Union     Spouse name: Not on file    Number of children: Not on file    Years of education: Not on file    Highest education level: Not on file   Occupational History    Not on file   Social Needs    Financial resource strain: Not on file    Food insecurity     Worry: Not on file     Inability: Not on file   [employer] needs     Medical: Not on file     Non-medical: Not on file   Tobacco Use    Smoking status: Never Smoker    Smokeless tobacco: Never Used   Substance and Sexual Activity    Alcohol use: Yes     Frequency: Monthly or less    Drug use: Never    Sexual activity: Not on file   Lifestyle    Physical activity     Days per week: Not on file     Minutes per session: Not on file    Stress: Not on file   Relationships    Social connections     Talks on phone: Not on file     Gets together: Not on file     Attends Congregation service: Not on file     Active member of club or organization: Not on file     Attends meetings of clubs or organizations: Not on file     Relationship status: Not on file    Intimate partner violence     Fear of current or ex partner: Not on file     Emotionally abused: Not on file     Physically abused: Not on file     Forced sexual activity: Not on file   Other Topics Concern    Not on file   Social History Narrative    Daily caffeine - none       Objective   /74   Pulse 82   Temp 97 6 °F (36 4 °C)   Resp 18   Ht 5' 10" (1 778 m)   Wt 98 4 kg (217 lb)   SpO2 98%   BMI 31 14 kg/m²      Physical Exam     Physical Exam  Constitutional:       General: He is awake  He is not in acute distress  Appearance: Normal appearance  He is well-developed and overweight  Cardiovascular:      Rate and Rhythm: Normal rate  Rhythm irregularly irregular  Pulses:           Dorsalis pedis pulses are 2+ on the right side and 2+ on the left side  Heart sounds: No friction rub  Pulmonary:      Effort: Pulmonary effort is normal       Breath sounds: Normal breath sounds  No decreased breath sounds, wheezing, rhonchi or rales  Musculoskeletal:      Right foot: Normal range of motion  No deformity, bunion, foot drop or prominent metatarsal heads  Left foot: Normal range of motion  No deformity, bunion, foot drop or prominent metatarsal heads  Feet:    Feet:      Right foot:      Skin integrity: Skin integrity normal  No ulcer, blister, skin breakdown, erythema or warmth  Left foot:      Skin integrity: Skin integrity normal  No ulcer, blister, skin breakdown, erythema or warmth  Neurological:      Mental Status: He is alert  Psychiatric:         Behavior: Behavior is cooperative           Tanvi Heard PA-C

## 2021-02-08 ENCOUNTER — IMMUNIZATIONS (OUTPATIENT)
Dept: FAMILY MEDICINE CLINIC | Facility: HOSPITAL | Age: 71
End: 2021-02-08

## 2021-02-08 DIAGNOSIS — Z23 ENCOUNTER FOR IMMUNIZATION: Primary | ICD-10-CM

## 2021-02-08 PROCEDURE — 91301 SARS-COV-2 / COVID-19 MRNA VACCINE (MODERNA) 100 MCG: CPT

## 2021-02-08 PROCEDURE — 0011A SARS-COV-2 / COVID-19 MRNA VACCINE (MODERNA) 100 MCG: CPT

## 2021-03-08 ENCOUNTER — IMMUNIZATIONS (OUTPATIENT)
Dept: FAMILY MEDICINE CLINIC | Facility: HOSPITAL | Age: 71
End: 2021-03-08

## 2021-03-08 DIAGNOSIS — Z23 ENCOUNTER FOR IMMUNIZATION: Primary | ICD-10-CM

## 2021-03-08 PROCEDURE — 91301 SARS-COV-2 / COVID-19 MRNA VACCINE (MODERNA) 100 MCG: CPT

## 2021-03-08 PROCEDURE — 0012A SARS-COV-2 / COVID-19 MRNA VACCINE (MODERNA) 100 MCG: CPT

## 2021-06-24 ENCOUNTER — TELEPHONE (OUTPATIENT)
Dept: GASTROENTEROLOGY | Facility: CLINIC | Age: 71
End: 2021-06-24

## 2021-06-24 ENCOUNTER — PREP FOR PROCEDURE (OUTPATIENT)
Dept: GASTROENTEROLOGY | Facility: CLINIC | Age: 71
End: 2021-06-24

## 2021-06-24 DIAGNOSIS — K21.9 GASTROESOPHAGEAL REFLUX DISEASE, UNSPECIFIED WHETHER ESOPHAGITIS PRESENT: Primary | ICD-10-CM

## 2021-06-24 NOTE — TELEPHONE ENCOUNTER
Sent clearance to Dr Akshat Poole at [facility] to get permission for patient to hold his eliquis 1 day for his EGD and cardiac clearance due to patient having an implantation in 2019 of a pacemaker/defibrillator combination  Will call in 2 weeks if I don't hear anything back  Patient isn't scheduled until 9/21/21

## 2021-07-08 NOTE — TELEPHONE ENCOUNTER
Patient isn't scheduled until 9/21 for procedure  [facility] doesn't clear until 30 days or less prior to procedure  Will check 8/21 to see if they have clearance

## 2021-07-13 ENCOUNTER — OFFICE VISIT (OUTPATIENT)
Dept: URGENT CARE | Facility: CLINIC | Age: 71
End: 2021-07-13
Payer: MEDICARE

## 2021-07-13 VITALS
OXYGEN SATURATION: 98 % | RESPIRATION RATE: 18 BRPM | HEART RATE: 66 BPM | HEIGHT: 70 IN | BODY MASS INDEX: 30.06 KG/M2 | WEIGHT: 210 LBS | SYSTOLIC BLOOD PRESSURE: 118 MMHG | TEMPERATURE: 98 F | DIASTOLIC BLOOD PRESSURE: 60 MMHG

## 2021-07-13 DIAGNOSIS — J01.90 ACUTE SINUSITIS, RECURRENCE NOT SPECIFIED, UNSPECIFIED LOCATION: Primary | ICD-10-CM

## 2021-07-13 PROCEDURE — G0463 HOSPITAL OUTPT CLINIC VISIT: HCPCS | Performed by: PHYSICIAN ASSISTANT

## 2021-07-13 PROCEDURE — 99213 OFFICE O/P EST LOW 20 MIN: CPT | Performed by: PHYSICIAN ASSISTANT

## 2021-07-13 RX ORDER — AMOXICILLIN 875 MG/1
875 TABLET, COATED ORAL 2 TIMES DAILY
Qty: 20 TABLET | Refills: 0 | Status: SHIPPED | OUTPATIENT
Start: 2021-07-13 | End: 2021-07-23

## 2021-07-13 NOTE — PROGRESS NOTES
[facility]    NAME: Krystyna Copeland is a 66 y o  male  : 1950    MRN: [id number]  DATE: 2021  TIME: 12:54 PM    Assessment and Plan   Acute sinusitis, recurrence not specified, unspecified location [J01 90]  1  Acute sinusitis, recurrence not specified, unspecified location  amoxicillin (AMOXIL) 875 mg tablet       Patient Instructions     Patient Instructions   I have prescribed an antibiotic for the infection  Please take the antibiotic as prescribed and finish the entire prescription  I recommend that the patient takes an over the counter probiotic or eats yogurt with live cultures in it [place]) to keep good bacteria in the gut and help prevent diarrhea  Wash hands frequently to prevent the spread of infection  Can use over the counter cough and cold medications to help with symptoms  Ibuprofen and/or tylenol as needed for pain or fever  If not improving over the next 7-10 days, follow up with PCP  Chief Complaint     Chief Complaint   Patient presents with    Sinusitis     nasal congestion, sinus pressure for 1 week       Continue Flonase  History of Present Illness     68-year-old male here with complaints of sinus congestion, postnasal drip and sinus headache and pressure for the last week  States became progressively worse  gets this at least once a year  Denies any fever chills, body aches  Has been taking Nasonex as prescribed  Review of Systems   Review of Systems   Constitutional: Negative for chills, fatigue and fever  HENT: Positive for congestion, postnasal drip and sinus pressure  Negative for ear pain and sore throat  Respiratory: Positive for cough  Negative for shortness of breath and wheezing  Neurological: Positive for headaches  All other systems reviewed and are negative        Current Medications     Current Outpatient Medications:     albuterol (PROVENTIL HFA,VENTOLIN HFA) 90 mcg/act inhaler, Inhale 2 puffs every 6 (six) hours as needed for wheezing, Disp: , Rfl:     betamethasone dipropionate (DIPROSONE) 0 05 % cream, Apply topically 2 (two) times a day, Disp: , Rfl:     carvedilol (COREG) 6 25 mg tablet, Take 6 25 mg by mouth daily, Disp: , Rfl:     COENZYME Q10-OMEGA 3 FATTY ACD PO, Take 2,000 mg by mouth daily, Disp: , Rfl:     ELIQUIS 5 MG, Take 5 mg by mouth 2 (two) times a day , Disp: , Rfl:     esomeprazole (NexIUM) 40 MG capsule, Take 40 mg by mouth every morning before breakfast, Disp: , Rfl:     Flaxseed, Linseed, (FLAXSEED OIL) 1000 MG CAPS, Take 2,000 mg by mouth daily, Disp: , Rfl:     fluticasone (FLONASE) 50 mcg/act nasal spray, 2 sprays into each nostril daily, Disp: 1 Bottle, Rfl: 1    ibuprofen (MOTRIN) 200 mg tablet, Take by mouth every 6 (six) hours as needed for mild pain, Disp: , Rfl:     levothyroxine 112 mcg tablet, Take 112 mcg by mouth daily , Disp: , Rfl:     lisinopril (ZESTRIL) 40 mg tablet, Take 1 tablet (40 mg total) by mouth daily, Disp: , Rfl: 0    loratadine (CLARITIN) 10 mg tablet, Take 10 mg by mouth daily, Disp: , Rfl:     simvastatin (ZOCOR) 20 mg tablet, Take 20 mg by mouth daily at bedtime , Disp: , Rfl:     SYMBICORT 160-4 5 MCG/ACT inhaler, Inhale 2 puffs 2 (two) times a day , Disp: , Rfl:     zolpidem (AMBIEN) 5 mg tablet, , Disp: , Rfl:     amoxicillin (AMOXIL) 875 mg tablet, Take 1 tablet (875 mg total) by mouth 2 (two) times a day for 10 days, Disp: 20 tablet, Rfl: 0    benzonatate (TESSALON PERLES) 100 mg capsule, Take 1 capsule (100 mg total) by mouth 3 (three) times a day as needed for cough (Patient not taking: Reported on 11/7/2020), Disp: 20 capsule, Rfl: 0    travoprost (TRAVATAN-Z) 0 004 % ophthalmic solution, 1 drop daily at bedtime (Patient not taking: Reported on 7/13/2021), Disp: , Rfl:     Current Allergies     Allergies as of 07/13/2021 - Reviewed 07/13/2021   Allergen Reaction Noted    Procardia [nifedipine] Palpitations 03/25/2019          The following portions of the patient's history were reviewed and updated as appropriate: allergies, current medications, past family history, past medical history, past social history, past surgical history and problem list    Past Medical History:   Diagnosis Date    LAKISHA (acute kidney injury) ([facility] [address] ) 10/1/2019    Allergic rhinitis     Arthritis     Atrial fibrillation (HCC)     Chronic anticoagulation     Chronic kidney disease     Colon polyps     Coronary artery disease     CPAP (continuous positive airway pressure) dependence     bi pap    GERD (gastroesophageal reflux disease)     Hyperlipidemia     Hypertrophic cardiomegaly     Irregular heart beat     a fib    Pneumonia     Seasonal allergies     Sleep apnea      Past Surgical History:   Procedure Laterality Date    CARDIAC DEFIBRILLATOR PLACEMENT  2018    CATARACT EXTRACTION  2019    CATARACT EXTRACTION W/ INTRAOCULAR LENS IMPLANT Right 4/9/2019    Procedure: EXCHANGE OF IOL;  Surgeon: Nico Sampson MD;  Location: [address] [place] [address];  Service: Ophthalmology    COLONOSCOPY      q 3 years    FRACTURE SURGERY  1969    INSERT / Prentis Daysi / Brooks Lindquist RMVL INSJ IO LENS PROSTH W/O ECP Right 3/26/2019    Procedure: PHACO W/IOL & LRI;  Surgeon: Nico Sampson MD;  Location: [address] [place] [address];  Service: Ophthalmology    [place] XCAPSL CTRC RMVL INSJ IO LENS PROSTH W/O ECP Left 5/7/2019    Procedure: DR LOW DELAROSA [facility] W/IOL & LRI;  Surgeon: Nico Sampson MD;  Location: [address] [place] [address];  Service: Ophthalmology    TONSILLECTOMY       Family History   Problem Relation Age of Onset    Colon cancer Mother      Social History     Socioeconomic History    Marital status: /Civil Union     Spouse name: Not on file    Number of children: Not on file    Years of education: Not on file    Highest education level: Not on file   Occupational History    Not on file   Tobacco Use    Smoking status: Never Smoker    Smokeless tobacco: Never Used   Vaping Use    Vaping Use: Never used Substance and Sexual Activity    Alcohol use: Yes    Drug use: Never    Sexual activity: Not on file   Other Topics Concern    Not on file   Social History Narrative    Daily caffeine - none     Social Determinants of Health     Financial Resource Strain:     Difficulty of Paying Living Expenses:    Food Insecurity:     Worried About Running Out of Food in the Last Year:     [address] in the Last Year:    Transportation Needs:     Lack of Transportation (Medical):  Lack of Transportation (Non-Medical):    Physical Activity:     Days of Exercise per Week:     Minutes of Exercise per Session:    Stress:     Feeling of Stress :    Social Connections:     Frequency of Communication with Friends and Family:     Frequency of Social Gatherings with Friends and Family:     Attends Restoration Services:     Active Member of Clubs or Organizations:     Attends Club or Organization Meetings:     Marital Status:    Intimate Partner Violence:     Fear of Current or Ex-Partner:     Emotionally Abused:     Physically Abused:     Sexually Abused:      Medications have been verified  Objective   /60   Pulse 66   Temp 98 °F (36 7 °C) (Temporal)   Resp 18   Ht 5' 10" (1 778 m)   Wt 95 3 kg (210 lb)   SpO2 98%   BMI 30 13 kg/m²      Physical Exam   Physical Exam  Vitals and nursing note reviewed  Constitutional:       General: He is not in acute distress  Appearance: He is well-developed  HENT:      Head: Normocephalic and atraumatic  Right Ear: Tympanic membrane normal       Left Ear: Tympanic membrane normal       Nose: Congestion present  No mucosal edema  Mouth/Throat:      Pharynx: Posterior oropharyngeal erythema (Postnasal drip) present  Cardiovascular:      Rate and Rhythm: Normal rate and regular rhythm  Heart sounds: Normal heart sounds  Pulmonary:      Effort: Pulmonary effort is normal  No respiratory distress        Breath sounds: Normal breath sounds

## 2021-07-21 ENCOUNTER — APPOINTMENT (OUTPATIENT)
Dept: RADIOLOGY | Facility: CLINIC | Age: 71
End: 2021-07-21
Payer: MEDICARE

## 2021-07-21 ENCOUNTER — OFFICE VISIT (OUTPATIENT)
Dept: URGENT CARE | Facility: CLINIC | Age: 71
End: 2021-07-21
Payer: MEDICARE

## 2021-07-21 VITALS
HEIGHT: 70 IN | DIASTOLIC BLOOD PRESSURE: 77 MMHG | HEART RATE: 81 BPM | SYSTOLIC BLOOD PRESSURE: 129 MMHG | RESPIRATION RATE: 18 BRPM | OXYGEN SATURATION: 98 % | TEMPERATURE: 97.6 F | BODY MASS INDEX: 30.92 KG/M2 | WEIGHT: 216 LBS

## 2021-07-21 DIAGNOSIS — M79.675 TOE PAIN, LEFT: Primary | ICD-10-CM

## 2021-07-21 DIAGNOSIS — M79.675 TOE PAIN, LEFT: ICD-10-CM

## 2021-07-21 PROCEDURE — 99213 OFFICE O/P EST LOW 20 MIN: CPT | Performed by: PHYSICIAN ASSISTANT

## 2021-07-21 PROCEDURE — G0463 HOSPITAL OUTPT CLINIC VISIT: HCPCS | Performed by: PHYSICIAN ASSISTANT

## 2021-07-21 PROCEDURE — 73630 X-RAY EXAM OF FOOT: CPT

## 2021-07-21 NOTE — PROGRESS NOTES
[address]          NAME: Bharati Avila is a 66 y o  male  : 1950    MRN: [id number]  DATE: 2021  TIME: 12:32 PM    Assessment and Plan   Toe pain, left [M79 675]  1  Toe pain, left  XR foot 3+ vw left       Patient Instructions   Xray appears negative for any fracture  Will follow up with radiologist report when available  Recommend elevating body part, icing the area every 2 hours for 20-30 minutes, take Ibuprofen every 6-8 hours to reduce inflammation  If not improving over the next week, follow up with PCP or orthopedics  To present to the ER if symptoms worsen  Chief Complaint     Chief Complaint   Patient presents with    Toe Pain     L 3 rd toe pain Stubbed it yesterday         History of Present Illness   Verna Tirado presents to the clinic with wife c/o    Leg Pain   The incident occurred 12 to 24 hours ago  The incident occurred at home  The injury mechanism was a direct blow (stubbed toe)  The pain is present in the left toes  The quality of the pain is described as aching  The pain is at a severity of 1/10  The pain is mild  The pain has been constant since onset  Pertinent negatives include no inability to bear weight, loss of motion, loss of sensation, muscle weakness, numbness or tingling  He reports no foreign bodies present  Nothing aggravates the symptoms  He has tried nothing for the symptoms  The treatment provided no relief  Review of Systems   Review of Systems   Constitutional: Negative for chills, diaphoresis, fatigue and fever  HENT: Negative for congestion, ear discharge, ear pain and facial swelling  Eyes: Negative for photophobia, pain, discharge, redness, itching and visual disturbance  Respiratory: Negative for apnea, cough, chest tightness, shortness of breath and wheezing  Cardiovascular: Negative for chest pain and palpitations  Gastrointestinal: Negative for abdominal pain     Musculoskeletal: Positive for arthralgias  Skin: Negative for color change, rash and wound  Neurological: Negative for dizziness, tingling, numbness and headaches  Hematological: Negative for adenopathy           Current Medications     Long-Term Medications   Medication Sig Dispense Refill    betamethasone dipropionate (DIPROSONE) 0 05 % cream Apply topically 2 (two) times a day      carvedilol (COREG) 6 25 mg tablet Take 6 25 mg by mouth daily      ELIQUIS 5 MG Take 5 mg by mouth 2 (two) times a day       esomeprazole (NexIUM) 40 MG capsule Take 40 mg by mouth every morning before breakfast      fluticasone (FLONASE) 50 mcg/act nasal spray 2 sprays into each nostril daily 1 Bottle 1    ibuprofen (MOTRIN) 200 mg tablet Take by mouth every 6 (six) hours as needed for mild pain      levothyroxine 112 mcg tablet Take 112 mcg by mouth daily       lisinopril (ZESTRIL) 40 mg tablet Take 1 tablet (40 mg total) by mouth daily  0    loratadine (CLARITIN) 10 mg tablet Take 10 mg by mouth daily      simvastatin (ZOCOR) 20 mg tablet Take 20 mg by mouth daily at bedtime       SYMBICORT 160-4 5 MCG/ACT inhaler Inhale 2 puffs 2 (two) times a day       zolpidem (AMBIEN) 5 mg tablet       travoprost (TRAVATAN-Z) 0 004 % ophthalmic solution 1 drop daily at bedtime (Patient not taking: Reported on 7/13/2021)         Current Allergies     Allergies as of 07/21/2021 - Reviewed 07/21/2021   Allergen Reaction Noted    Procardia [nifedipine] Palpitations 03/25/2019            The following portions of the patient's history were reviewed and updated as appropriate: allergies, current medications, past family history, past medical history, past social history, past surgical history and problem list   Past Medical History:   Diagnosis Date    LAKISHA [facility]acute kidney injury) ([facility] [address] ) 10/1/2019    Allergic rhinitis     Arthritis     Atrial fibrillation (HCC)     Chronic anticoagulation     Chronic kidney disease     Colon polyps     Coronary artery disease     CPAP (continuous positive airway pressure) dependence     bi pap    GERD (gastroesophageal reflux disease)     Hyperlipidemia     Hypertrophic cardiomegaly     Irregular heart beat     a fib    Pneumonia     Seasonal allergies     Sleep apnea      Past Surgical History:   Procedure Laterality Date    CARDIAC DEFIBRILLATOR PLACEMENT  2018    CATARACT EXTRACTION  2019    CATARACT EXTRACTION W/ INTRAOCULAR LENS IMPLANT Right 4/9/2019    Procedure: EXCHANGE OF IOL;  Surgeon: Aislinn Gee MD;  Location: [address] [place] [address] OR;  Service: Ophthalmology    COLONOSCOPY      q 3 years    FRACTURE SURGERY  1969    INSERT / REPLACE / Bettyjane Banana RMVL INSJ IO LENS PROSTH W/O ECP Right 3/26/2019    Procedure: PHACO W/IOL & LRI;  Surgeon: Aislinn Gee MD;  Location: [address] [place] [address] OR;  Service: Ophthalmology    [place] XCAPSL CTRC RMVL INSJ IO LENS PROSTH W/O ECP Left 5/7/2019    Procedure: DR LOW DELAROSA [facility] W/IOL & LRI;  Surgeon: Aislinn Gee MD;  Location: [address] [place] [address] OR;  Service: Ophthalmology    TONSILLECTOMY       Social History     Socioeconomic History    Marital status: /Civil Union     Spouse name: Not on file    Number of children: Not on file    Years of education: Not on file    Highest education level: Not on file   Occupational History    Not on file   Tobacco Use    Smoking status: Never Smoker    Smokeless tobacco: Never Used   Vaping Use    Vaping Use: Never used   Substance and Sexual Activity    Alcohol use: Yes    Drug use: Never    Sexual activity: Not on file   Other Topics Concern    Not on file   Social History Narrative    Daily caffeine - none     Social Determinants of Health     Financial Resource Strain:     Difficulty of Paying Living Expenses:    Food Insecurity:     Worried About Running Out of Food in the Last Year:     [address] in the Last Year:    Transportation Needs:     Lack of Transportation (Medical):      Lack of Transportation (Non-Medical): Physical Activity:     Days of Exercise per Week:     Minutes of Exercise per Session:    Stress:     Feeling of Stress :    Social Connections:     Frequency of Communication with Friends and Family:     Frequency of Social Gatherings with Friends and Family:     Attends Religion Services:     Active Member of Clubs or Organizations:     Attends Club or Organization Meetings:     Marital Status:    Intimate Partner Violence:     Fear of Current or Ex-Partner:     Emotionally Abused:     Physically Abused:     Sexually Abused:        Objective   /77   Pulse 81   Temp 97 6 °F (36 4 °C)   Resp 18   Ht 5' 10" (1 778 m)   Wt 98 kg (216 lb)   SpO2 98%   BMI 30 99 kg/m²      Physical Exam     Physical Exam  Vitals and nursing note reviewed  Constitutional:       General: He is not in acute distress  Appearance: He is well-developed  He is not diaphoretic  HENT:      Head: Normocephalic and atraumatic  Right Ear: External ear normal       Left Ear: External ear normal       Nose: Nose normal    Eyes:      General: No scleral icterus  Right eye: No discharge  Left eye: No discharge  Conjunctiva/sclera: Conjunctivae normal    Cardiovascular:      Rate and Rhythm: Normal rate and regular rhythm  Heart sounds: Normal heart sounds  No murmur heard  No friction rub  No gallop  Pulmonary:      Effort: Pulmonary effort is normal  No respiratory distress  Breath sounds: Normal breath sounds  No decreased breath sounds, wheezing, rhonchi or rales  Musculoskeletal:      Left foot: Normal capillary refill  Tenderness (mild DIP third and mild eccymosis) present  No swelling  Skin:     General: Skin is warm and dry  Coloration: Skin is not pale  Findings: No erythema or rash  Neurological:      Mental Status: He is alert and oriented to person, place, and time  Psychiatric:         Behavior: Behavior normal          Thought Content:  Thought content normal          Judgment: Judgment normal          Donnell Ricketts PA-C

## 2021-08-30 NOTE — TELEPHONE ENCOUNTER
Called and spoke with Samreen Vargas at Dr Peggy Mckeon office regarding eliquis and card clearance  She will take care of it and fax to me  Will watch for fax  If not received within a week, will call again

## 2021-09-07 NOTE — TELEPHONE ENCOUNTER
Patient had called regarding his eliquis and pacemaker  He also called Yash Hernandez at Dr Sameer Felipe office and I talked to her as well  She just sent this to Dr Ga Lizarraga for cardiac clearance  Will f/u next week if not returned

## 2021-09-13 ENCOUNTER — TELEPHONE (OUTPATIENT)
Dept: GASTROENTEROLOGY | Facility: HOSPITAL | Age: 71
End: 2021-09-13

## 2021-09-14 NOTE — TELEPHONE ENCOUNTER
Received clearance from Dr Shama Conroy  Patient can hold Eliquis 1 day prior to his procedure and he is to send remote transmission to them from home on his pacemaker  Patient informed

## 2021-09-20 ENCOUNTER — TELEPHONE (OUTPATIENT)
Dept: GASTROENTEROLOGY | Facility: HOSPITAL | Age: 71
End: 2021-09-20

## 2021-09-21 ENCOUNTER — HOSPITAL ENCOUNTER (OUTPATIENT)
Dept: GASTROENTEROLOGY | Facility: HOSPITAL | Age: 71
Setting detail: OUTPATIENT SURGERY
Discharge: HOME | End: 2021-09-21
Attending: INTERNAL MEDICINE
Payer: MEDICARE

## 2021-09-21 ENCOUNTER — ANESTHESIA (OUTPATIENT)
Dept: GASTROENTEROLOGY | Facility: HOSPITAL | Age: 71
End: 2021-09-21

## 2021-09-21 ENCOUNTER — ANESTHESIA EVENT (OUTPATIENT)
Dept: GASTROENTEROLOGY | Facility: HOSPITAL | Age: 71
End: 2021-09-21

## 2021-09-21 VITALS
OXYGEN SATURATION: 96 % | WEIGHT: 220 LBS | HEIGHT: 70 IN | TEMPERATURE: 97.5 F | RESPIRATION RATE: 14 BRPM | SYSTOLIC BLOOD PRESSURE: 113 MMHG | BODY MASS INDEX: 31.5 KG/M2 | HEART RATE: 58 BPM | DIASTOLIC BLOOD PRESSURE: 66 MMHG

## 2021-09-21 DIAGNOSIS — K21.9 GASTROESOPHAGEAL REFLUX DISEASE, UNSPECIFIED WHETHER ESOPHAGITIS PRESENT: ICD-10-CM

## 2021-09-21 DIAGNOSIS — K22.70 BARRETT'S ESOPHAGUS WITHOUT DYSPLASIA: ICD-10-CM

## 2021-09-21 PROCEDURE — 88305 TISSUE EXAM BY PATHOLOGIST: CPT | Performed by: PATHOLOGY

## 2021-09-21 PROCEDURE — 43239 EGD BIOPSY SINGLE/MULTIPLE: CPT | Performed by: INTERNAL MEDICINE

## 2021-09-21 RX ORDER — LIDOCAINE HYDROCHLORIDE 10 MG/ML
INJECTION, SOLUTION EPIDURAL; INFILTRATION; INTRACAUDAL; PERINEURAL AS NEEDED
Status: DISCONTINUED | OUTPATIENT
Start: 2021-09-21 | End: 2021-09-21

## 2021-09-21 RX ORDER — SODIUM CHLORIDE, SODIUM LACTATE, POTASSIUM CHLORIDE, CALCIUM CHLORIDE 600; 310; 30; 20 MG/100ML; MG/100ML; MG/100ML; MG/100ML
INJECTION, SOLUTION INTRAVENOUS CONTINUOUS PRN
Status: DISCONTINUED | OUTPATIENT
Start: 2021-09-21 | End: 2021-09-21

## 2021-09-21 RX ORDER — SODIUM CHLORIDE, SODIUM LACTATE, POTASSIUM CHLORIDE, CALCIUM CHLORIDE 600; 310; 30; 20 MG/100ML; MG/100ML; MG/100ML; MG/100ML
125 INJECTION, SOLUTION INTRAVENOUS CONTINUOUS
Status: DISCONTINUED | OUTPATIENT
Start: 2021-09-21 | End: 2021-09-25 | Stop reason: HOSPADM

## 2021-09-21 RX ORDER — PROPOFOL 10 MG/ML
INJECTION, EMULSION INTRAVENOUS AS NEEDED
Status: DISCONTINUED | OUTPATIENT
Start: 2021-09-21 | End: 2021-09-21

## 2021-09-21 RX ADMIN — PROPOFOL 100 MG: 10 INJECTION, EMULSION INTRAVENOUS at 08:22

## 2021-09-21 RX ADMIN — SODIUM CHLORIDE, SODIUM LACTATE, POTASSIUM CHLORIDE, AND CALCIUM CHLORIDE: .6; .31; .03; .02 INJECTION, SOLUTION INTRAVENOUS at 08:17

## 2021-09-21 RX ADMIN — LIDOCAINE HYDROCHLORIDE 50 MG: 10 INJECTION, SOLUTION EPIDURAL; INFILTRATION; INTRACAUDAL; PERINEURAL at 08:22

## 2021-09-21 RX ADMIN — SODIUM CHLORIDE, SODIUM LACTATE, POTASSIUM CHLORIDE, AND CALCIUM CHLORIDE: .6; .31; .03; .02 INJECTION, SOLUTION INTRAVENOUS at 08:00

## 2021-09-21 NOTE — H&P
History and Physical - [facility]  Nathalie Calderon 66 y o  male MRN: [id number]                  HPI: Nathalie Calderon is a 66y o  year old male who presents for upper endoscopy  Patient has history of longstanding gastroesophageal reflux disease and Read's esophagus  His last endoscopy was more than two years ago  He denies any dysphagia odynophagia  He is currently on multiple medications including Eliquis for cardiac dysrhythmia  REVIEW OF SYSTEMS: Per the HPI, and otherwise unremarkable      Historical Information   Past Medical History:   Diagnosis Date    LAKISHA (acute kidney injury) ([address] ) 10/1/2019    Allergic rhinitis     Arthritis     Atrial fibrillation (HCC)     Chronic anticoagulation     Chronic kidney disease     Colon polyps     Coronary artery disease     CPAP (continuous positive airway pressure) dependence     bi pap    GERD (gastroesophageal reflux disease)     Hyperlipidemia     Hypertrophic cardiomegaly     Irregular heart beat     a fib    Pneumonia     Seasonal allergies     Sleep apnea      Past Surgical History:   Procedure Laterality Date    CARDIAC DEFIBRILLATOR PLACEMENT  2018    CATARACT EXTRACTION  2019    CATARACT EXTRACTION W/ INTRAOCULAR LENS IMPLANT Right 4/9/2019    Procedure: EXCHANGE OF IOL;  Surgeon: Arya Geller MD;  Location: [address] [place] [address];  Service: Ophthalmology    COLONOSCOPY      q 3 years    FRACTURE SURGERY  1969    Katelyn Hipps / Gilbert Brooking / [employer] RMVL INSJ IO LENS PROSTH W/O ECP Right 3/26/2019    Procedure: PHACO W/IOL & LRI;  Surgeon: Arya Geller MD;  Location: [address] [place] [address];  Service: Ophthalmology    [place] XCAPSL CTRC RMVL INSJ IO LENS PROSTH W/O ECP Left 5/7/2019    Procedure: DR LOW DELAROSA [facility] W/IOL & LRI;  Surgeon: Arya Geller MD;  Location: [address] [place] [address];  Service: Ophthalmology    TONSILLECTOMY       Social History   Social History     Substance and Sexual Activity   Alcohol Use Yes     Social History Substance and Sexual Activity   Drug Use Never     Social History     Tobacco Use   Smoking Status Never Smoker   Smokeless Tobacco Never Used     Family History   Problem Relation Age of Onset    Colon cancer Mother        Meds/Allergies       Current Outpatient Medications:     albuterol (PROVENTIL HFA,VENTOLIN HFA) 90 mcg/act inhaler    carvedilol (COREG) 6 25 mg tablet    COENZYME Q10-OMEGA 3 FATTY ACD PO    ELIQUIS 5 MG    esomeprazole (NexIUM) 40 MG capsule    Flaxseed, Linseed, (FLAXSEED OIL) 1000 MG CAPS    fluticasone (FLONASE) 50 mcg/act nasal spray    ibuprofen (MOTRIN) 200 mg tablet    levothyroxine 112 mcg tablet    lisinopril (ZESTRIL) 40 mg tablet    loratadine (CLARITIN) 10 mg tablet    simvastatin (ZOCOR) 20 mg tablet    SYMBICORT 160-4 5 MCG/ACT inhaler    zolpidem (AMBIEN) 5 mg tablet    benzonatate (TESSALON PERLES) 100 mg capsule    betamethasone dipropionate (DIPROSONE) 0 05 % cream    travoprost (TRAVATAN-Z) 0 004 % ophthalmic solution    Current Facility-Administered Medications:     lactated ringers infusion, 125 mL/hr, Intravenous, Continuous    Facility-Administered Medications Ordered in Other Encounters:     lactated ringers infusion, , Intravenous, Continuous PRN, New Bag at 09/21/21 0800    Allergies   Allergen Reactions    Procardia [Nifedipine] Palpitations       Objective     /88   Pulse 70   Temp (!) 97 1 °F (36 2 °C) (Temporal)   Resp 16   Ht 5' 10" (1 778 m)   Wt 99 8 kg (220 lb)   SpO2 98%   BMI 31 57 kg/m²       PHYSICAL EXAM    Gen: NAD  Head: NCAT  CV: RRR  CHEST: Clear  ABD: soft, NT/ND  EXT: no edema      ASSESSMENT/PLAN:  This is a 66y o  year old male here for upper endoscopy, and he is stable and optimized for his procedure

## 2021-09-21 NOTE — DISCHARGE SUMMARY
Discharge Summary - Remigio Aburto 66 y o  male MRN: [id number]    Unit/Bed#:  Encounter: [id number]    Admission Date:  09/21/2021    Admitting Diagnosis: Gastroesophageal reflux disease, unspecified whether esophagitis present [K21 9]  Read's esophagus without dysplasia [K22 70]    HPI:  Upper endoscopy done    Procedures Performed: No orders of the defined types were placed in this encounter  Summary of Hospital Course: Tolerated procedure well    Significant Findings, Care, Treatment and Services Provided:  Gastric bezoar noted    Complications:  None    Discharge Diagnosis:  See procedure note    Medical Problems     Resolved Problems  Date Reviewed: 11/19/2020    None                Condition at Discharge: good         Discharge instructions/Information to patient and family:   See after visit summary for information provided to patient and family  Provisions for Follow-Up Care:  See after visit summary for information related to follow-up care and any pertinent home health orders        PCP: Balbir Black MD    Disposition: Home

## 2021-09-21 NOTE — ANESTHESIA POSTPROCEDURE EVALUATION
Post-Op Assessment Note    CV Status:  Stable  Pain Score: 0    Pain management: adequate     Mental Status:  Sleepy and awake   Hydration Status:  Euvolemic   PONV Controlled:  Controlled   Airway Patency:  Patent      Post Op Vitals Reviewed: Yes      Staff: CRNA         No complications documented      /69 (09/21/21 0830)    Temp 97 8 °F (36 6 °C) (09/21/21 0830)    Pulse 59 (09/21/21 0830)   Resp 22 (09/21/21 0830)    SpO2 99 % (09/21/21 0830)

## 2021-09-21 NOTE — ANESTHESIA PREPROCEDURE EVALUATION
Procedure:  EGD    Relevant Problems   CARDIO   (+) Paroxysmal atrial fibrillation (HCC)   (+) Sick sinus syndrome (HCC)      ENDO   (+) Hypothyroidism      /RENAL   (+) Acute renal failure superimposed on stage 3 chronic kidney disease (HCC)      HEMATOLOGY   (+) Thrombocytopenia (HCC)      PULMONARY   (+) JUANI (obstructive sleep apnea)        Physical Exam    Airway    Mallampati score: III  TM Distance: >3 FB  Neck ROM: full     Dental   No notable dental hx     Cardiovascular  Rhythm: regular, Rate: normal, No murmur, Cardiovascular exam normal    Pulmonary  Pulmonary exam normal Breath sounds clear to auscultation, No wheezes, No rales,     Other Findings        Anesthesia Plan  ASA Score- 4     Anesthesia Type- IV sedation with anesthesia with ASA Monitors  Additional Monitors:   Airway Plan:           Plan Factors-Exercise tolerance (METS): >4 METS  Chart reviewed  EKG reviewed  Existing labs reviewed  Patient is not a current smoker  Patient instructed to abstain from smoking on day of procedure  Patient did not smoke on day of surgery  Obstructive sleep apnea risk education given perioperatively  Induction- intravenous  Postoperative Plan-     Informed Consent- Anesthetic plan and risks discussed with patient  I personally reviewed this patient with the CRNA  Discussed and agreed on the Anesthesia Plan with the CRNA  Sony Jane

## 2021-09-21 NOTE — INTERVAL H&P NOTE
H&P reviewed  After examining the patient I find no changes in the patients condition since the H&P had been written      Vitals:    09/21/21 0735   BP: 153/88   Pulse: 70   Resp: 16   Temp: (!) 97 1 °F (36 2 °C)   SpO2: 98%

## 2021-09-21 NOTE — DISCHARGE INSTRUCTIONS
Upper Endoscopy   WHAT YOU NEED TO KNOW:   An upper endoscopy is also called an upper gastrointestinal (GI) endoscopy, or an esophagogastroduodenoscopy (EGD)  You may feel bloated, gassy, or have some abdominal discomfort after your procedure  Your throat may be sore for 24 to 36 hours  You may burp or pass gas from air that is still inside your body  DISCHARGE INSTRUCTIONS:   Call 911 for any of the following:   · You have sudden chest pain or trouble breathing  Seek care immediately if:   · You feel dizzy or faint  · You have trouble swallowing  · Your bowel movements are very dark or black  · Your abdomen is hard and firm and you have severe pain  · You vomit blood  Contact your healthcare provider if:   · You feel full or bloated and cannot burp or pass gas  · You have not had a bowel movement for 3 days after your procedure  · You have neck pain  · You have a fever or chills  · You have nausea or are vomiting  · You have a rash or hives  · You have questions or concerns about your endoscopy  Relieve a sore throat:  Suck on throat lozenges or crushed ice  Gargle with a small amount of warm salt water  Mix 1 teaspoon of salt and 1 cup of warm water to make salt water  Relieve gas and discomfort from bloating:  Lie on your right side with a heating pad on your abdomen  Take short walks to help pass gas  Eat small meals until bloating is relieved  Rest after your procedure: You have been given medicine to relax you  Do not  drive or make important decisions until the day after your procedure  Return to your normal activity as directed  You can usually return to work the day after your procedure  Follow up with your healthcare provider as directed:  Write down your questions so you remember to ask them during your visits     © 2017 [address] is for End User's use only and may not be sold, redistributed or otherwise used for commercial purposes  All illustrations and images included in CareNotes® are the copyrighted property of [employer]  or Tyler Currie  The above information is an  only  It is not intended as medical advice for individual conditions or treatments  Talk to your doctor, nurse or pharmacist before following any medical regimen to see if it is safe and effective for you  Gastroparesis   WHAT YOU NEED TO KNOW:   Gastroparesis is a condition that causes food to move more slowly than normal from the stomach to the intestines  Gastroparesis is not caused by blockage  Often, the cause may not be known  It may be caused by damage to a nerve that controls muscles used to move food to your small intestines  DISCHARGE INSTRUCTIONS:   You or someone else should call 911 if:   · Your heart is beating faster and you are breathing faster than usual     · You cannot be woken up  Seek care immediately if:   · You are confused or have trouble thinking clearly  · You are dizzy or very drowsy  Contact your healthcare provider if:   · You are urinating less than usual     · Your symptoms return or become worse  · The color of your urine is dark yellow  · You have questions or concerns about your condition or care  Medicines:   · Medicines may  be given to control your nausea and vomiting  You may  also receive medicines that help food move through your stomach at a more normal rate  · Take your medicine as directed  Contact your healthcare provider if you think your medicine is not helping or if you have side effects  Tell him or her if you are allergic to any medicine  Keep a list of the medicines, vitamins, and herbs you take  Include the amounts, and when and why you take them  Bring the list or the pill bottles to follow-up visits  Carry your medicine list with you in case of an emergency  Follow up with your healthcare provider as directed:   You may need tests to check if treatment is working  You may need to see a dietitian for help with a nutrition plan  Write down your questions so you remember to ask them during your visits  Self-care: Your healthcare provider may suggest any of the following:  · Change your eating habits  Take small bites of food to make it easier for your body to digest  You may need to eat several small meals low in fiber and fat throughout the day  Ask your dietitian for help with planning meals  · Do not eat raw fruits, vegetables, or whole grains  These can cause you to have undigested food in your stomach  The undigested food can form a blockage that can become life-threatening  · Drink liquids as directed  Liquids will prevent dehydration caused by vomiting  Slowly drink small amounts of liquids at a time  Ask your healthcare provider how much liquid to drink each day, and which liquids are best for you  You may also need to drink an oral rehydration solution (ORS)  An ORS has the right amounts of sugar, salt, and minerals in water to replace body fluids  · Do not lie down for 2 hours after your meals  Walking and sitting after meals help with digestion  · Control your blood sugar levels if you have diabetes  High blood sugar levels may make your symptoms worse  Ask your healthcare provider how to control your blood sugar levels  © Copyright [employer] 2021 Information is for End User's use only and may not be sold, redistributed or otherwise used for commercial purposes  All illustrations and images included in CareNotes® are the copyrighted property of A D A M [employer]  or Rebekah Lazo   The above information is an  only  It is not intended as medical advice for individual conditions or treatments  Talk to your doctor, nurse or pharmacist before following any medical regimen to see if it is safe and effective for you

## 2021-11-08 ENCOUNTER — IMMUNIZATIONS (OUTPATIENT)
Dept: FAMILY MEDICINE CLINIC | Facility: HOSPITAL | Age: 71
End: 2021-11-08

## 2021-11-08 DIAGNOSIS — Z23 ENCOUNTER FOR IMMUNIZATION: Primary | ICD-10-CM

## 2021-11-08 PROCEDURE — 0013A COVID-19 MODERNA VACC 0.25 ML BOOSTER: CPT

## 2021-11-08 PROCEDURE — 91306 COVID-19 MODERNA VACC 0.25 ML BOOSTER: CPT

## 2021-11-30 ENCOUNTER — OFFICE VISIT (OUTPATIENT)
Dept: GASTROENTEROLOGY | Facility: CLINIC | Age: 71
End: 2021-11-30
Payer: MEDICARE

## 2021-11-30 VITALS
HEIGHT: 70 IN | BODY MASS INDEX: 30.06 KG/M2 | HEART RATE: 70 BPM | WEIGHT: 210 LBS | DIASTOLIC BLOOD PRESSURE: 82 MMHG | SYSTOLIC BLOOD PRESSURE: 139 MMHG

## 2021-11-30 DIAGNOSIS — K31.84 GASTROPARESIS: Primary | ICD-10-CM

## 2021-11-30 DIAGNOSIS — K63.5 POLYP OF COLON, UNSPECIFIED PART OF COLON, UNSPECIFIED TYPE: ICD-10-CM

## 2021-11-30 DIAGNOSIS — D69.6 THROMBOCYTOPENIA (HCC): ICD-10-CM

## 2021-11-30 DIAGNOSIS — K21.9 GASTROESOPHAGEAL REFLUX DISEASE WITHOUT ESOPHAGITIS: ICD-10-CM

## 2021-11-30 PROCEDURE — 99214 OFFICE O/P EST MOD 30 MIN: CPT | Performed by: INTERNAL MEDICINE

## 2021-11-30 RX ORDER — AMLODIPINE BESYLATE 2.5 MG/1
TABLET ORAL
COMMUNITY
Start: 2021-11-29

## 2021-11-30 RX ORDER — CARVEDILOL 25 MG/1
TABLET ORAL
COMMUNITY
Start: 2021-10-09

## 2021-12-29 ENCOUNTER — OFFICE VISIT (OUTPATIENT)
Dept: URGENT CARE | Facility: CLINIC | Age: 71
End: 2021-12-29
Payer: MEDICARE

## 2021-12-29 VITALS
RESPIRATION RATE: 18 BRPM | HEART RATE: 73 BPM | OXYGEN SATURATION: 98 % | BODY MASS INDEX: 30.78 KG/M2 | WEIGHT: 215 LBS | HEIGHT: 70 IN | TEMPERATURE: 97 F

## 2021-12-29 DIAGNOSIS — J06.9 ACUTE URI: ICD-10-CM

## 2021-12-29 DIAGNOSIS — J01.90 ACUTE SINUSITIS, RECURRENCE NOT SPECIFIED, UNSPECIFIED LOCATION: Primary | ICD-10-CM

## 2021-12-29 PROCEDURE — G0463 HOSPITAL OUTPT CLINIC VISIT: HCPCS

## 2021-12-29 PROCEDURE — 87636 SARSCOV2 & INF A&B AMP PRB: CPT

## 2021-12-29 PROCEDURE — 99213 OFFICE O/P EST LOW 20 MIN: CPT

## 2021-12-29 RX ORDER — AMOXICILLIN 875 MG/1
875 TABLET, COATED ORAL 2 TIMES DAILY
Qty: 20 TABLET | Refills: 0 | Status: SHIPPED | OUTPATIENT
Start: 2021-12-29 | End: 2022-01-08

## 2022-01-03 ENCOUNTER — TELEPHONE (OUTPATIENT)
Dept: URGENT CARE | Facility: CLINIC | Age: 72
End: 2022-01-03

## 2022-01-03 LAB
FLUAV RNA RESP QL NAA+PROBE: NEGATIVE
FLUBV RNA RESP QL NAA+PROBE: NEGATIVE
SARS-COV-2 RNA RESP QL NAA+PROBE: NEGATIVE

## 2022-05-12 ENCOUNTER — TELEPHONE (OUTPATIENT)
Dept: GASTROENTEROLOGY | Facility: CLINIC | Age: 72
End: 2022-05-12

## 2022-05-12 NOTE — TELEPHONE ENCOUNTER
I called and left message for Dr Michele Martell nurse asking to call back regarding cardiac clearance  I did receive patient's cardiac clearance for 6/7/22 for Eliquis hold but I do need the paper filled out that the patient is in fact cleared and with the [address]

## 2022-05-25 ENCOUNTER — OFFICE VISIT (OUTPATIENT)
Dept: URGENT CARE | Facility: CLINIC | Age: 72
End: 2022-05-25
Payer: MEDICARE

## 2022-05-25 VITALS — HEART RATE: 61 BPM | OXYGEN SATURATION: 99 % | RESPIRATION RATE: 16 BRPM | TEMPERATURE: 97.4 F

## 2022-05-25 DIAGNOSIS — R11.0 NAUSEA: Primary | ICD-10-CM

## 2022-05-25 DIAGNOSIS — Z20.822 CONTACT WITH AND (SUSPECTED) EXPOSURE TO COVID-19: ICD-10-CM

## 2022-05-25 DIAGNOSIS — A08.4 VIRAL INTESTINAL INFECTION: ICD-10-CM

## 2022-05-25 PROCEDURE — G0463 HOSPITAL OUTPT CLINIC VISIT: HCPCS | Performed by: PHYSICIAN ASSISTANT

## 2022-05-25 PROCEDURE — 99213 OFFICE O/P EST LOW 20 MIN: CPT | Performed by: PHYSICIAN ASSISTANT

## 2022-05-25 PROCEDURE — 87636 SARSCOV2 & INF A&B AMP PRB: CPT | Performed by: PHYSICIAN ASSISTANT

## 2022-05-25 RX ORDER — ONDANSETRON 4 MG/1
4 TABLET, ORALLY DISINTEGRATING ORAL EVERY 6 HOURS PRN
Qty: 20 TABLET | Refills: 0 | Status: SHIPPED | OUTPATIENT
Start: 2022-05-25

## 2022-05-25 NOTE — PROGRESS NOTES
[address]        NAME: Dafne Yu is a 66 y o  male  : 1950    MRN: [id number]  DATE: May 25, 2022  TIME: 12:52 PM    Assessment and Plan   Nausea [R11 0]  1  Nausea  ondansetron (Zofran ODT) 4 mg disintegrating tablet   2  Viral intestinal infection           Patient Instructions       Follow up with PCP in 3-5 days  Proceed to  ER if symptoms worsen  Chief Complaint     Chief Complaint   Patient presents with    Abdominal Pain     Pt c/o abdominal heaviness and dizziness for three days  History of Present Illness       Patient is a 67 yo male who presents for evaluation of abdominal fullness x 3 days  Reports he just returned from a cruise and also reports that his  and her family have had the stomach bug  Associated nausea  Denies fevers, headache, fatigue, diarrhea, vomiting, and abdominal pain  Review of Systems   Review of Systems   Constitutional: Positive for appetite change  Negative for chills, fatigue and fever  Gastrointestinal: Positive for nausea  Negative for abdominal distention, abdominal pain, diarrhea and vomiting  Musculoskeletal: Negative for arthralgias and myalgias  Skin: Negative for color change  Neurological: Negative for headaches  Psychiatric/Behavioral: Negative for confusion           Current Medications       Current Outpatient Medications:     ondansetron (Zofran ODT) 4 mg disintegrating tablet, Take 1 tablet (4 mg total) by mouth every 6 (six) hours as needed for nausea or vomiting, Disp: 20 tablet, Rfl: 0    albuterol (PROVENTIL HFA,VENTOLIN HFA) 90 mcg/act inhaler, Inhale 2 puffs every 6 (six) hours as needed for wheezing, Disp: , Rfl:     amLODIPine (NORVASC) 2 5 mg tablet, , Disp: , Rfl:     carvedilol (COREG) 25 mg tablet, , Disp: , Rfl:     COENZYME Q10-OMEGA 3 FATTY ACD PO, Take 2,000 mg by mouth daily, Disp: , Rfl:     ELIQUIS 5 MG, Take 5 mg by mouth 2 (two) times a day , Disp: , Rfl:     esomeprazole (NexIUM) 20 mg capsule, Take 1 capsule (20 mg total) by mouth daily in the early morning Take 1/2 hour before breakfast, Disp: 60 capsule, Rfl: 2    Flaxseed, Linseed, (FLAXSEED OIL) 1000 MG CAPS, Take 2,000 mg by mouth daily, Disp: , Rfl:     fluticasone (FLONASE) 50 mcg/act nasal spray, 2 sprays into each nostril daily, Disp: 1 Bottle, Rfl: 1    levothyroxine 112 mcg tablet, Take 112 mcg by mouth daily , Disp: , Rfl:     lisinopril (ZESTRIL) 40 mg tablet, Take 1 tablet (40 mg total) by mouth daily, Disp: , Rfl: 0    loratadine (CLARITIN) 10 mg tablet, Take 10 mg by mouth daily, Disp: , Rfl:     simvastatin (ZOCOR) 20 mg tablet, Take 20 mg by mouth daily at bedtime , Disp: , Rfl:     SYMBICORT 160-4 5 MCG/ACT inhaler, Inhale 2 puffs 2 (two) times a day , Disp: , Rfl:     travoprost (TRAVATAN-Z) 0 004 % ophthalmic solution, 1 drop daily at bedtime (Patient not taking: Reported on 7/13/2021), Disp: , Rfl:     zolpidem (AMBIEN) 5 mg tablet, , Disp: , Rfl:     Current Allergies     Allergies as of 05/25/2022 - Reviewed 05/25/2022   Allergen Reaction Noted    Procardia [nifedipine] Palpitations 03/25/2019            The following portions of the patient's history were reviewed and updated as appropriate: allergies, current medications, past family history, past medical history, past social history, past surgical history and problem list      Past Medical History:   Diagnosis Date    LAKISHA [facility]acute kidney [facility] ([facility] [address] ) 10/1/2019    Allergic rhinitis     Arthritis     Atrial fibrillation (HCC)     Chronic anticoagulation     Chronic kidney disease     Colon polyps     Coronary artery disease     CPAP (continuous positive airway pressure) dependence     bi pap    GERD (gastroesophageal reflux disease)     Hyperlipidemia     Hypertrophic cardiomegaly     Irregular heart beat     a fib    Pneumonia     Seasonal allergies     Sleep apnea        Past Surgical History:   Procedure Laterality Date    CARDIAC DEFIBRILLATOR PLACEMENT  2018    CATARACT EXTRACTION  2019    CATARACT EXTRACTION W/ INTRAOCULAR LENS IMPLANT Right 4/9/2019    Procedure: EXCHANGE OF IOL;  Surgeon: Jean Marie Medellin MD;  Location:  MAIN OR;  Service: Ophthalmology    COLONOSCOPY      q 3 years    FRACTURE SURGERY  1969    INSERT / Jo-Ann Newman / Suryablair Nims W/O ECP Right 3/26/2019    Procedure: PHACO W/IOL & LRI;  Surgeon: Jean Marie Medellin MD;  Location: [facility] MAIN OR;  Service: Ophthalmology    WA XCAPSL CTRC RMVL INSJ IO LENS PROSTH W/O ECP Left 5/7/2019    Procedure: DR LOW DELAROSA [facility] W/IOL & LRI;  Surgeon: Jean Marie Medellin MD;  Location: [facility] MAIN OR;  Service: Ophthalmology    TONSILLECTOMY         Family History   Problem Relation Age of Onset    Colon cancer Mother          Medications have been verified  Objective   Pulse 61   Temp (!) 97 4 °F (36 3 °C)   Resp 16   SpO2 99%        Physical Exam     Physical Exam  Constitutional:       Appearance: Normal appearance  HENT:      Right Ear: Tympanic membrane, ear canal and external ear normal       Left Ear: Tympanic membrane, ear canal and external ear normal       Nose: Nose normal       Mouth/Throat:      Mouth: Mucous membranes are moist       Pharynx: Oropharynx is clear  Eyes:      Extraocular Movements: Extraocular movements intact  Conjunctiva/sclera: Conjunctivae normal       Pupils: Pupils are equal, round, and reactive to light  Cardiovascular:      Rate and Rhythm: Normal rate and regular rhythm  Pulses: Normal pulses  Heart sounds: Normal heart sounds  Pulmonary:      Effort: Pulmonary effort is normal       Breath sounds: Normal breath sounds  Abdominal:      General: Bowel sounds are normal       Palpations: Abdomen is soft  Tenderness: There is no abdominal tenderness  There is no guarding or rebound  Musculoskeletal:         General: Normal range of motion  Cervical back: Normal range of motion  Skin:     General: Skin is warm and dry  Capillary Refill: Capillary refill takes less than 2 seconds  Neurological:      General: No focal deficit present  Mental Status: He is alert and oriented to person, place, and time  Cranial Nerves: Cranial nerves are intact  Motor: Motor function is intact  Coordination: Coordination is intact  Gait: Gait is intact  Deep Tendon Reflexes: Reflexes are normal and symmetric     Psychiatric:         Mood and Affect: Mood normal          Behavior: Behavior normal

## 2022-05-31 ENCOUNTER — TELEPHONE (OUTPATIENT)
Dept: GASTROENTEROLOGY | Facility: CLINIC | Age: 72
End: 2022-05-31

## 2022-05-31 NOTE — TELEPHONE ENCOUNTER
I called pt confirming his colonoscopy/EGD scheduled on 6/7/22 with Dr Jaime Garrido at [facility]  I informed pt that [facility] will be calling him one day prior with arrival time  I informed pt of clear liquid diet the day before as well as bowel cleansing preparation  I advised pt that he will need a  due to being under sedation the day of the procedure  Pt did not have any questions

## 2022-06-07 ENCOUNTER — ANESTHESIA (OUTPATIENT)
Dept: GASTROENTEROLOGY | Facility: HOSPITAL | Age: 72
End: 2022-06-07

## 2022-06-07 ENCOUNTER — HOSPITAL ENCOUNTER (OUTPATIENT)
Dept: GASTROENTEROLOGY | Facility: HOSPITAL | Age: 72
Setting detail: OUTPATIENT SURGERY
Discharge: HOME | End: 2022-06-07
Attending: INTERNAL MEDICINE
Payer: MEDICARE

## 2022-06-07 ENCOUNTER — ANESTHESIA EVENT (OUTPATIENT)
Dept: GASTROENTEROLOGY | Facility: HOSPITAL | Age: 72
End: 2022-06-07

## 2022-06-07 VITALS
DIASTOLIC BLOOD PRESSURE: 59 MMHG | HEIGHT: 70 IN | OXYGEN SATURATION: 96 % | SYSTOLIC BLOOD PRESSURE: 104 MMHG | WEIGHT: 210.5 LBS | TEMPERATURE: 97.8 F | HEART RATE: 77 BPM | RESPIRATION RATE: 18 BRPM | BODY MASS INDEX: 30.14 KG/M2

## 2022-06-07 DIAGNOSIS — K31.84 GASTROPARESIS: ICD-10-CM

## 2022-06-07 DIAGNOSIS — K63.5 POLYP OF COLON, UNSPECIFIED PART OF COLON, UNSPECIFIED TYPE: ICD-10-CM

## 2022-06-07 DIAGNOSIS — K21.9 GASTROESOPHAGEAL REFLUX DISEASE WITHOUT ESOPHAGITIS: ICD-10-CM

## 2022-06-07 PROCEDURE — 88305 TISSUE EXAM BY PATHOLOGIST: CPT | Performed by: PATHOLOGY

## 2022-06-07 PROCEDURE — 45385 COLONOSCOPY W/LESION REMOVAL: CPT | Performed by: INTERNAL MEDICINE

## 2022-06-07 PROCEDURE — 45380 COLONOSCOPY AND BIOPSY: CPT | Performed by: INTERNAL MEDICINE

## 2022-06-07 PROCEDURE — 43239 EGD BIOPSY SINGLE/MULTIPLE: CPT | Performed by: INTERNAL MEDICINE

## 2022-06-07 RX ORDER — PROPOFOL 10 MG/ML
INJECTION, EMULSION INTRAVENOUS AS NEEDED
Status: DISCONTINUED | OUTPATIENT
Start: 2022-06-07 | End: 2022-06-07

## 2022-06-07 RX ORDER — SODIUM CHLORIDE, SODIUM LACTATE, POTASSIUM CHLORIDE, CALCIUM CHLORIDE 600; 310; 30; 20 MG/100ML; MG/100ML; MG/100ML; MG/100ML
75 INJECTION, SOLUTION INTRAVENOUS CONTINUOUS
Status: DISCONTINUED | OUTPATIENT
Start: 2022-06-07 | End: 2022-06-11 | Stop reason: HOSPADM

## 2022-06-07 RX ORDER — LIDOCAINE HYDROCHLORIDE 20 MG/ML
INJECTION, SOLUTION EPIDURAL; INFILTRATION; INTRACAUDAL; PERINEURAL AS NEEDED
Status: DISCONTINUED | OUTPATIENT
Start: 2022-06-07 | End: 2022-06-07

## 2022-06-07 RX ADMIN — LIDOCAINE HYDROCHLORIDE 100 MG: 20 INJECTION, SOLUTION EPIDURAL; INFILTRATION; INTRACAUDAL at 07:34

## 2022-06-07 RX ADMIN — PROPOFOL 10 MG: 10 INJECTION, EMULSION INTRAVENOUS at 07:59

## 2022-06-07 RX ADMIN — PROPOFOL 20 MG: 10 INJECTION, EMULSION INTRAVENOUS at 07:45

## 2022-06-07 RX ADMIN — SODIUM CHLORIDE, SODIUM LACTATE, POTASSIUM CHLORIDE, AND CALCIUM CHLORIDE 75 ML/HR: .6; .31; .03; .02 INJECTION, SOLUTION INTRAVENOUS at 07:11

## 2022-06-07 RX ADMIN — PROPOFOL 20 MG: 10 INJECTION, EMULSION INTRAVENOUS at 07:51

## 2022-06-07 RX ADMIN — PROPOFOL 20 MG: 10 INJECTION, EMULSION INTRAVENOUS at 07:48

## 2022-06-07 RX ADMIN — PROPOFOL 20 MG: 10 INJECTION, EMULSION INTRAVENOUS at 07:54

## 2022-06-07 RX ADMIN — PROPOFOL 20 MG: 10 INJECTION, EMULSION INTRAVENOUS at 07:43

## 2022-06-07 RX ADMIN — PROPOFOL 50 MG: 10 INJECTION, EMULSION INTRAVENOUS at 07:39

## 2022-06-07 RX ADMIN — PROPOFOL 20 MG: 10 INJECTION, EMULSION INTRAVENOUS at 07:56

## 2022-06-07 RX ADMIN — PROPOFOL 120 MG: 10 INJECTION, EMULSION INTRAVENOUS at 07:34

## 2022-06-07 NOTE — ANESTHESIA PREPROCEDURE EVALUATION
Procedure:  COLONOSCOPY  EGD    Relevant Problems   CARDIO   (+) Paroxysmal atrial fibrillation (HCC)   (+) Sick sinus syndrome (HCC)      ENDO   (+) Hypothyroidism      /RENAL   (+) Acute renal failure superimposed on stage 3 chronic kidney disease (HCC)      HEMATOLOGY   (+) Thrombocytopenia (HCC)      PULMONARY   (+) JUANI (obstructive sleep apnea)        Physical Exam    Airway    Mallampati score: I  TM Distance: >3 FB  Neck ROM: full     Dental       Cardiovascular      Pulmonary      Other Findings        Anesthesia Plan  ASA Score- 3     Anesthesia Type- IV sedation with anesthesia with ASA Monitors  Additional Monitors:   Airway Plan:           Plan Factors-Exercise tolerance (METS): >4 METS  Chart reviewed  EKG reviewed  Imaging results reviewed  Existing labs reviewed  Patient summary reviewed  Patient is not a current smoker  Patient did not smoke on day of surgery  Obstructive sleep apnea risk education given perioperatively  Induction- intravenous  Postoperative Plan-     Informed Consent- Anesthetic plan and risks discussed with patient  I personally reviewed this patient with the CRNA  Discussed and agreed on the Anesthesia Plan with the CRNA           NPO appropriate  Discussed benefits/risks of monitored anesthetic care and discussed providing a dynamic level of mild to deep sedation  Risks include awareness, airway obstruction, aspiration which may necessitate conversion to general anesthesia  All questions answered  Patient understands and wishes to proceed  Anesthesia plan and consent discussed with Heidy Schulz who expressed understanding and agreement  Risks/benefits and alternatives discussed with patient including possible PONV, sore throat, damage to teeth/lips/gums and possibility of rare anesthetic and surgical emergencies

## 2022-06-07 NOTE — DISCHARGE SUMMARY
Discharge Summary - Christie Staley 66 y o  male MRN: [id number]    Unit/Bed#:  Encounter: [id number]    Admission Date:  06/07/2022    Admitting Diagnosis: Polyp of colon, unspecified part of colon, unspecified type [K63 5]  Gastroparesis [K31 84]  Gastroesophageal reflux disease without esophagitis [K21 9]    HPI:  History of gastroparesis and reflux  History of colon polyp  Procedures Performed: No orders of the defined types were placed in this encounter  Summary of Hospital Course: Tolerated procedure well    Significant Findings, Care, Treatment and Services Provided: Three polyps removed from the colon  Diverticulosis noted  Small sliding hiatal hernia noted  Mild reflux  Complications:  None    Discharge Diagnosis:  See above    Medical Problems             Resolved Problems  Date Reviewed: 12/29/2021   None                 Condition at Discharge: good         Discharge instructions/Information to patient and family:   See after visit summary for information provided to patient and family  Provisions for Follow-Up Care:  See after visit summary for information related to follow-up care and any pertinent home health orders        PCP: Odette Waller MD    Disposition: Home

## 2022-06-07 NOTE — H&P
History and Physical - [facility]  Abby Gonsalez 66 y o  male MRN: [id number]                  HPI: Abby Gonsalez is a 66y o  year old male who presents for upper endoscopy and colonoscopy  History of gastroparesis and abdominal pain  History of colon polyp  Last colonoscopy five years ago  REVIEW OF SYSTEMS: Per the HPI, and otherwise unremarkable      Historical Information   Past Medical History:   Diagnosis Date    LAKISHA (acute kidney injury) ([facility] [address] ) 10/01/2019    Allergic rhinitis     Arthritis     Asthma     Atrial fibrillation (HCC)     Chronic anticoagulation     Chronic kidney disease     Colon polyps     Coronary artery disease     CPAP (continuous positive airway pressure) dependence     bi pap    Disease of thyroid gland     GERD (gastroesophageal reflux disease)     Hyperlipidemia     Hypertrophic cardiomegaly     Irregular heart beat     a fib    Pneumonia     Seasonal allergies     Sleep apnea      Past Surgical History:   Procedure Laterality Date    CARDIAC DEFIBRILLATOR PLACEMENT  2018    CATARACT EXTRACTION  2019    CATARACT EXTRACTION W/ INTRAOCULAR LENS IMPLANT Right 04/09/2019    Procedure: EXCHANGE OF IOL;  Surgeon: Kadeem Cintron MD;  Location: [address] [place] [address];  Service: Ophthalmology    COLONOSCOPY      q 3 years    EGD      FRACTURE SURGERY  1969    INSERT / REPLACE / Maralee Lowe RMVL INSJ IO LENS PROSTH W/O ECP Right 03/26/2019    Procedure: PHACO W/IOL & LRI;  Surgeon: Kadeem Cintron MD;  Location: [address] [place] [address];  Service: Ophthalmology    TX XCAPSL CTRC RMVL INSJ IO LENS PROSTH W/O ECP Left 05/07/2019    Procedure: DR LOW DELAROSA [facility] W/IOL & LRI;  Surgeon: Kadeem Cintron MD;  Location: [address] [place] [address];  Service: Ophthalmology    TONSILLECTOMY       Social History   Social History     Substance and Sexual Activity   Alcohol Use Yes     Social History     Substance and Sexual Activity   Drug Use Never     Social History     Tobacco Use Smoking Status Never Smoker   Smokeless Tobacco Never Used     Family History   Problem Relation Age of Onset    Colon cancer Mother        Meds/Allergies       Current Outpatient Medications:     amLODIPine (NORVASC) 2 5 mg tablet    carvedilol (COREG) 25 mg tablet    COENZYME Q10-OMEGA 3 FATTY ACD PO    esomeprazole (NexIUM) 20 mg capsule    Flaxseed, Linseed, (FLAXSEED OIL) 1000 MG CAPS    fluticasone (FLONASE) 50 mcg/act nasal spray    levothyroxine 112 mcg tablet    lisinopril (ZESTRIL) 40 mg tablet    loratadine (CLARITIN) 10 mg tablet    simvastatin (ZOCOR) 20 mg tablet    SYMBICORT 160-4 5 MCG/ACT inhaler    zolpidem (AMBIEN) 5 mg tablet    albuterol (PROVENTIL HFA,VENTOLIN HFA) 90 mcg/act inhaler    ELIQUIS 5 MG    ondansetron (Zofran ODT) 4 mg disintegrating tablet    travoprost (TRAVATAN-Z) 0 004 % ophthalmic solution    Current Facility-Administered Medications:     lactated ringers infusion, 75 mL/hr, Intravenous, Continuous, 75 mL/hr at 06/07/22 0711    Allergies   Allergen Reactions    Procardia [Nifedipine] Palpitations       Objective     /75   Pulse 84   Temp (!) 97 °F (36 1 °C) (Temporal)   Resp (!) 24   Ht 5' 10" (1 778 m)   Wt 95 5 kg (210 lb 8 oz)   SpO2 98%   BMI 30 20 kg/m²       PHYSICAL EXAM    Gen: NAD  Head: NCAT  CV: RRR  CHEST: Clear  ABD: soft, NT/ND  EXT: no edema      ASSESSMENT/PLAN:  This is a 66y o  year old male here for EGD and colonoscopy, and he is stable and optimized for his procedure

## 2022-06-07 NOTE — ANESTHESIA POSTPROCEDURE EVALUATION
Post-Op Assessment Note    CV Status:  Stable  Pain Score: 0    Pain management: adequate     Mental Status:  Awake   Hydration Status:  Stable   PONV Controlled:  Controlled   Airway Patency:  Patent      Post Op Vitals Reviewed: Yes      Staff: CRNA         No complications documented      BP   123/72   Temp     Pulse  86   Resp  12   SpO2   98

## 2022-06-07 NOTE — INTERVAL H&P NOTE
H&P reviewed  After examining the patient I find no changes in the patients condition since the H&P had been written      Vitals:    06/07/22 0703   BP: 149/75   Pulse: 84   Resp: (!) 24   Temp: (!) 97 °F (36 1 °C)   SpO2: 98%

## 2022-11-26 ENCOUNTER — APPOINTMENT (OUTPATIENT)
Dept: RADIOLOGY | Facility: CLINIC | Age: 72
End: 2022-11-26

## 2022-11-26 ENCOUNTER — OFFICE VISIT (OUTPATIENT)
Dept: URGENT CARE | Facility: CLINIC | Age: 72
End: 2022-11-26

## 2022-11-26 VITALS
RESPIRATION RATE: 18 BRPM | DIASTOLIC BLOOD PRESSURE: 71 MMHG | BODY MASS INDEX: 29.35 KG/M2 | SYSTOLIC BLOOD PRESSURE: 125 MMHG | WEIGHT: 205 LBS | HEART RATE: 67 BPM | OXYGEN SATURATION: 99 % | HEIGHT: 70 IN | TEMPERATURE: 97.2 F

## 2022-11-26 DIAGNOSIS — M54.50 ACUTE MIDLINE LOW BACK PAIN WITHOUT SCIATICA: ICD-10-CM

## 2022-11-26 DIAGNOSIS — M54.50 ACUTE MIDLINE LOW BACK PAIN WITHOUT SCIATICA: Primary | ICD-10-CM

## 2022-11-26 RX ORDER — PHENOL 1.4 %
AEROSOL, SPRAY (ML) MUCOUS MEMBRANE
COMMUNITY

## 2022-11-26 RX ORDER — TIZANIDINE HYDROCHLORIDE 2 MG/1
2 CAPSULE, GELATIN COATED ORAL
Qty: 7 CAPSULE | Refills: 0 | Status: SHIPPED | OUTPATIENT
Start: 2022-11-26

## 2022-11-26 NOTE — PROGRESS NOTES
[facility]        NAME: Jorge A Ramachandran is a 67 y o  male  : 1950    MRN: [id number]  DATE: 2022  TIME: 12:18 PM    Assessment and Plan   Acute midline low back pain without sciatica [M54 50]  1  Acute midline low back pain without sciatica  XR spine lumbar 2 or 3 views injury    TiZANidine (ZANAFLEX) 2 MG capsule            Patient Instructions     Patient Instructions   Loss of L4-L5 joint height, some degenerative changes seen  No point tenderness, edema or radicular symptoms on exam   Awaiting radiologist review  Ice/heat, rest  Tylenol and motrin otc  Icy/hot  Avoid bending, squatting or lifting  Follow up with PCP if symptoms persist or do not improve in 1-2 weeks  Go to ER with  severe pain, numbness, weakness, loss of bowel or bladder control  **Portions of the record may have been created with voice recognition software  Occasional wrong word or "sound a like" substitutions may have occurred due to the inherent limitations of voice recognition software  Read the chart carefully and recognize, using context, where substitutions have occurred  **     Chief Complaint     Chief Complaint   Patient presents with   • Fall     Yesterday: fell backwards hitting sacral area on wood decking causing immediate pain which is radiating up the back  Pain of 4/10  Landing in seated position & did not strike head during fall  History of Present Illness     Jorge A Ramachandran is a 67 y o  male presents to clinic today with complaints of low back pain x 1 day  He was helping EMS lift his wife off the ground when he slipped and fell onto his tailbone  He reports no tailbone pain, but has sacral  pain that radiates up his right lower back  Denies numbness tingling, weakness, incontinence  Taking aleve OTC with some relief  Review of Systems     Review of Systems   Constitutional: Negative for fever  Eyes: Negative for visual disturbance     Respiratory: Negative for shortness of breath  Cardiovascular: Negative for chest pain  Gastrointestinal: Negative for abdominal pain  Musculoskeletal: Positive for back pain and myalgias  Negative for arthralgias, gait problem, joint swelling, neck pain and neck stiffness  Skin: Negative for color change and wound  Neurological: Negative for dizziness, syncope, weakness, light-headedness, numbness and headaches           Current Medications     Current Outpatient Medications:   •  albuterol (PROVENTIL HFA,VENTOLIN HFA) 90 mcg/act inhaler, Inhale 2 puffs every 6 (six) hours as needed for wheezing, Disp: , Rfl:   •  amLODIPine (NORVASC) 2 5 mg tablet, , Disp: , Rfl:   •  carvedilol (COREG) 25 mg tablet, , Disp: , Rfl:   •  COENZYME Q10-OMEGA 3 FATTY ACD PO, Take 2,000 mg by mouth daily, Disp: , Rfl:   •  ELIQUIS 5 MG, Take 5 mg by mouth 2 (two) times a day , Disp: , Rfl:   •  esomeprazole (NexIUM) 20 mg capsule, Take 1 capsule (20 mg total) by mouth daily in the early morning Take 1/2 hour before breakfast, Disp: 60 capsule, Rfl: 2  •  Flaxseed, Linseed, (FLAXSEED OIL) 1000 MG CAPS, Take 2,000 mg by mouth daily, Disp: , Rfl:   •  fluticasone (FLONASE) 50 mcg/act nasal spray, 2 sprays into each nostril daily, Disp: 1 Bottle, Rfl: 1  •  levothyroxine 112 mcg tablet, Take 112 mcg by mouth daily , Disp: , Rfl:   •  lisinopril (ZESTRIL) 40 mg tablet, Take 1 tablet (40 mg total) by mouth daily, Disp: , Rfl: 0  •  loratadine (CLARITIN) 10 mg tablet, Take 10 mg by mouth daily, Disp: , Rfl:   •  Melatonin 10 MG TABS, Take by mouth, Disp: , Rfl:   •  simvastatin (ZOCOR) 20 mg tablet, Take 20 mg by mouth daily at bedtime , Disp: , Rfl:   •  SYMBICORT 160-4 5 MCG/ACT inhaler, Inhale 2 puffs 2 (two) times a day , Disp: , Rfl:   •  TiZANidine (ZANAFLEX) 2 MG capsule, Take 1 capsule (2 mg total) by mouth daily at bedtime as needed for muscle spasms, Disp: 7 capsule, Rfl: 0  •  travoprost (TRAVATAN-Z) 0 004 % ophthalmic solution, 1 drop daily at bedtime, Disp: , Rfl:   •  zolpidem (AMBIEN) 5 mg tablet, , Disp: , Rfl:   •  ondansetron (Zofran ODT) 4 mg disintegrating tablet, Take 1 tablet (4 mg total) by mouth every 6 (six) hours as needed for nausea or vomiting (Patient not taking: Reported on 11/26/2022), Disp: 20 tablet, Rfl: 0    Current Allergies     Allergies as of 11/26/2022 - Reviewed 11/26/2022   Allergen Reaction Noted   • Procardia [nifedipine] Palpitations 03/25/2019            The following portions of the patient's history were reviewed and updated as appropriate: allergies, current medications, past family history, past medical history, past social history, past surgical history and problem list      Past Medical History:   Diagnosis Date   • LAKISHA (acute kidney injury) ([facility] [address] ) 10/01/2019   • Allergic rhinitis    • Arthritis    • Asthma    • Atrial fibrillation (HCC)    • Chronic anticoagulation    • Chronic kidney disease    • Colon polyps    • Coronary artery disease    • CPAP (continuous positive airway pressure) dependence     bi pap   • Disease of thyroid gland    • GERD (gastroesophageal reflux disease)    • Hyperlipidemia    • Hypertrophic cardiomegaly    • Irregular heart beat     a fib   • Pneumonia    • Seasonal allergies    • Sleep apnea        Past Surgical History:   Procedure Laterality Date   • CARDIAC DEFIBRILLATOR PLACEMENT  2018   • CATARACT EXTRACTION  2019   • CATARACT EXTRACTION W/ INTRAOCULAR LENS IMPLANT Right 04/09/2019    Procedure: EXCHANGE OF IOL;  Surgeon: Garcia Rain MD;  Location: [address] [place] [address];  Service: Ophthalmology   • COLONOSCOPY      q 3 years   • EGD     • FRACTURE SURGERY  1969   • Cristina Charles / Earl Alvarado / Chelle Cruz     • [place] XCAPSL CTRC RMVL INSJ IO LENS PROSTH W/O ECP Right 03/26/2019    Procedure: DR LOW DELAROSA [facility] W/IOL & LRI;  Surgeon: Garcia Rain MD;  Location: [address] [place] [address];  Service: Ophthalmology   • NM XCAPSL CTRC RMVL INSJ IO LENS PROSTH W/O ECP Left 05/07/2019    Procedure: DR LOW DELAROSA [facility] W/IOL & LRI;  Surgeon: Garcia Rain MD;  Location: [address] [place] [address] OR;  Service: Ophthalmology   • TONSILLECTOMY         Family History   Problem Relation Age of Onset   • Colon cancer Mother          Medications have been verified  Objective     /71   Pulse 67   Temp (!) 97 2 °F (36 2 °C)   Resp 18   Ht 5' 10" (1 778 m)   Wt 93 kg (205 lb)   SpO2 99%   BMI 29 41 kg/m²        Physical Exam     Physical Exam  Vitals reviewed  Constitutional:       General: He is not in acute distress  Appearance: Normal appearance  He is not ill-appearing  HENT:      Head: Normocephalic and atraumatic  Cardiovascular:      Rate and Rhythm: Normal rate and regular rhythm  Pulses: Normal pulses  Heart sounds: Normal heart sounds  Pulmonary:      Effort: Pulmonary effort is normal       Breath sounds: Normal breath sounds  Musculoskeletal:      Thoracic back: Normal       Lumbar back: Tenderness present  No swelling, deformity, spasms or bony tenderness  Normal range of motion  Negative right straight leg raise test and negative left straight leg raise test       Comments: Right SI joint ttp   Skin:     General: Skin is warm and dry  Capillary Refill: Capillary refill takes less than 2 seconds  Neurological:      Mental Status: He is alert  Sensory: No sensory deficit

## 2022-11-26 NOTE — PATIENT INSTRUCTIONS
Loss of L4-L5 joint height, some degenerative changes seen  No point tenderness, edema or radicular symptoms on exam   Awaiting radiologist review  Ice/heat, rest  Tylenol and motrin otc  Icy/hot  Avoid bending, squatting or lifting  Follow up with PCP if symptoms persist or do not improve in 1-2 weeks  Go to ER with  severe pain, numbness, weakness, loss of bowel or bladder control

## 2022-12-29 ENCOUNTER — OFFICE VISIT (OUTPATIENT)
Dept: URGENT CARE | Facility: CLINIC | Age: 72
End: 2022-12-29

## 2022-12-29 VITALS — HEART RATE: 75 BPM | TEMPERATURE: 97.9 F | RESPIRATION RATE: 18 BRPM | OXYGEN SATURATION: 98 %

## 2022-12-29 DIAGNOSIS — J31.0 RHINITIS MEDICAMENTOSA: Primary | ICD-10-CM

## 2022-12-29 DIAGNOSIS — Z20.828 EXPOSURE TO THE FLU: ICD-10-CM

## 2022-12-29 DIAGNOSIS — T48.5X5A RHINITIS MEDICAMENTOSA: Primary | ICD-10-CM

## 2022-12-29 NOTE — PROGRESS NOTES
[facility]        NAME: Paulo Andrade is a 67 y o  male  : 1950    MRN: [id number]  DATE: 2022  TIME: 12:56 PM    Assessment and Plan   Rhinitis medicamentosa [J31 0, T48 5X5A]  1  Rhinitis medicamentosa        2  Exposure to the flu  Covid/Flu-Office Collect            Patient Instructions     Continue the use of Flonase  Vitamin D3 2000 IU daily  Vitamin C 1000mg twice per day  Multivitamin daily  Fluids and rest  Over the counter cold medication as needed (EX: Coricidin HBP, Mucinex, tylenol/motrin)  Follow up with PCP in 3-5 days  Proceed to ER if symptoms worsen  Chief Complaint     Chief Complaint   Patient presents with   • Sinus Problem     Sinus congestion and nasal drip for four days  History of Present Illness       Patient is a 66 yo male with significant PMH of AFIB, asthma, and sleep apnea presenting in the clinic today for cold sx x 4 days  Admits postnasal drip, congestion, sinus pressure, headache, body aches, and SOB  Denies fever, chills, cough, sore throat, chest pain, abdominal pain, n/v/d  Admits the use of Afrin, Coricidin, and advil for symptom management  Positive sick contacts as patient was exposed to flu A when visiting his wife at the nursing home  Patient states he is vaccinated against covid and flu  Review of Systems   Review of Systems   Constitutional: Negative for chills, fatigue and fever  HENT: Positive for congestion, postnasal drip and sinus pressure  Negative for ear pain, rhinorrhea, sinus pain and sore throat  Respiratory: Positive for shortness of breath  Negative for cough  Cardiovascular: Negative for chest pain  Gastrointestinal: Negative for abdominal pain, diarrhea, nausea and vomiting  Musculoskeletal: Positive for myalgias  Skin: Negative for rash  Neurological: Positive for headaches           Current Medications       Current Outpatient Medications:   •  albuterol (PROVENTIL HFA,VENTOLIN HFA) 90 mcg/act inhaler, Inhale 2 puffs every 6 (six) hours as needed for wheezing, Disp: , Rfl:   •  amLODIPine (NORVASC) 2 5 mg tablet, , Disp: , Rfl:   •  carvedilol (COREG) 25 mg tablet, , Disp: , Rfl:   •  COENZYME Q10-OMEGA 3 FATTY ACD PO, Take 2,000 mg by mouth daily, Disp: , Rfl:   •  ELIQUIS 5 MG, Take 5 mg by mouth 2 (two) times a day , Disp: , Rfl:   •  esomeprazole (NexIUM) 20 mg capsule, Take 1 capsule (20 mg total) by mouth daily in the early morning Take 1/2 hour before breakfast, Disp: 60 capsule, Rfl: 2  •  Flaxseed, Linseed, (FLAXSEED OIL) 1000 MG CAPS, Take 2,000 mg by mouth daily, Disp: , Rfl:   •  fluticasone (FLONASE) 50 mcg/act nasal spray, 2 sprays into each nostril daily, Disp: 1 Bottle, Rfl: 1  •  levothyroxine 112 mcg tablet, Take 112 mcg by mouth daily , Disp: , Rfl:   •  lisinopril (ZESTRIL) 40 mg tablet, Take 1 tablet (40 mg total) by mouth daily, Disp: , Rfl: 0  •  loratadine (CLARITIN) 10 mg tablet, Take 10 mg by mouth daily, Disp: , Rfl:   •  Melatonin 10 MG TABS, Take by mouth, Disp: , Rfl:   •  ondansetron (Zofran ODT) 4 mg disintegrating tablet, Take 1 tablet (4 mg total) by mouth every 6 (six) hours as needed for nausea or vomiting (Patient not taking: Reported on 11/26/2022), Disp: 20 tablet, Rfl: 0  •  simvastatin (ZOCOR) 20 mg tablet, Take 20 mg by mouth daily at bedtime , Disp: , Rfl:   •  SYMBICORT 160-4 5 MCG/ACT inhaler, Inhale 2 puffs 2 (two) times a day , Disp: , Rfl:   •  TiZANidine (ZANAFLEX) 2 MG capsule, Take 1 capsule (2 mg total) by mouth daily at bedtime as needed for muscle spasms, Disp: 7 capsule, Rfl: 0  •  travoprost (TRAVATAN-Z) 0 004 % ophthalmic solution, 1 drop daily at bedtime, Disp: , Rfl:   •  zolpidem (AMBIEN) 5 mg tablet, , Disp: , Rfl:     Current Allergies     Allergies as of 12/29/2022 - Reviewed 12/29/2022   Allergen Reaction Noted   • Procardia [nifedipine] Palpitations 03/25/2019            The following portions of the patient's history were reviewed and updated as appropriate: allergies, current medications, past family history, past medical history, past social history, past surgical history and problem list      Past Medical History:   Diagnosis Date   • LAKISHA (acute kidney injury) ([facility] [address] ) 10/01/2019   • Allergic rhinitis    • Arthritis    • Asthma    • Atrial fibrillation ([facility] )    • Chronic anticoagulation    • Chronic kidney disease    • Colon polyps    • Coronary artery disease    • CPAP (continuous positive airway pressure) dependence     bi pap   • Disease of thyroid gland    • GERD (gastroesophageal reflux disease)    • Hyperlipidemia    • Hypertrophic cardiomegaly    • Irregular heart beat     a fib   • Pneumonia    • Seasonal allergies    • Sleep apnea        Past Surgical History:   Procedure Laterality Date   • CARDIAC DEFIBRILLATOR PLACEMENT  2018   • CATARACT EXTRACTION  2019   • CATARACT EXTRACTION W/ INTRAOCULAR LENS IMPLANT Right 04/09/2019    Procedure: EXCHANGE OF IOL;  Surgeon: Charity Hurley MD;  Location: [address] [place] [address];  Service: Ophthalmology   • COLONOSCOPY      q 3 years   • EGD     • FRACTURE SURGERY  1969   • Adonay Pearson / Sraan Ke / Abi Alejo     • [place] XCAPSL CTRC RMVL INSJ IO LENS PROSTH W/O ECP Right 03/26/2019    Procedure: PHACO W/IOL & LRI;  Surgeon: Charity Hurley MD;  Location: [address] [place] [address];  Service: Ophthalmology   • [place] XCAPSL CTRC RMVL INSJ IO LENS PROSTH W/O ECP Left 05/07/2019    Procedure: PHACO W/IOL & LRI;  Surgeon: Charity Hurley MD;  Location: [address] [place] [address];  Service: Ophthalmology   • TONSILLECTOMY         Family History   Problem Relation Age of Onset   • Colon cancer Mother          Medications have been verified  Objective   Pulse 75   Temp 97 9 °F (36 6 °C)   Resp 18   SpO2 98%        Physical Exam     Physical Exam  Vitals reviewed  Constitutional:       General: He is not in acute distress  Appearance: Normal appearance  He is normal weight  He is not ill-appearing  HENT:      Head: Normocephalic and atraumatic  Right Ear: Hearing, tympanic membrane, ear canal and external ear normal  No middle ear effusion  There is no impacted cerumen  Tympanic membrane is not erythematous or bulging  Left Ear: Hearing, tympanic membrane, ear canal and external ear normal   No middle ear effusion  There is no impacted cerumen  Tympanic membrane is not erythematous or bulging  Nose: Congestion present  No rhinorrhea  Right Sinus: No maxillary sinus tenderness or frontal sinus tenderness  Left Sinus: No maxillary sinus tenderness or frontal sinus tenderness  Mouth/Throat:      Lips: Pink  Mouth: Mucous membranes are moist       Pharynx: Oropharynx is clear  Uvula midline  No pharyngeal swelling, oropharyngeal exudate or posterior oropharyngeal erythema  Tonsils: No tonsillar exudate or tonsillar abscesses  1+ on the right  1+ on the left  Comments: Copious postnasal drip present  Eyes:      General:         Right eye: No discharge  Left eye: No discharge  Conjunctiva/sclera: Conjunctivae normal    Cardiovascular:      Rate and Rhythm: Normal rate and regular rhythm  Pulses: Normal pulses  Heart sounds: Normal heart sounds  No murmur heard  No friction rub  No gallop  Pulmonary:      Effort: Pulmonary effort is normal       Breath sounds: Normal breath sounds  No wheezing, rhonchi or rales  Musculoskeletal:      Cervical back: Normal range of motion and neck supple  No tenderness  Lymphadenopathy:      Cervical: No cervical adenopathy  Skin:     General: Skin is warm  Capillary Refill: Capillary refill takes less than 2 seconds  Neurological:      Mental Status: He is alert     Psychiatric:         Mood and Affect: Mood normal          Behavior: Behavior normal

## 2022-12-29 NOTE — PATIENT INSTRUCTIONS
Continue the use of Flonase  Vitamin D3 2000 IU daily  Vitamin C 1000mg twice per day  Multivitamin daily  Fluids and rest  Over the counter cold medication as needed (EX: Coricidin HBP, Mucinex, tylenol/motrin)  Follow up with PCP in 3-5 days  Proceed to ER if symptoms worsen

## 2023-02-28 ENCOUNTER — APPOINTMENT (OUTPATIENT)
Dept: RADIOLOGY | Facility: CLINIC | Age: 73
End: 2023-02-28

## 2023-02-28 ENCOUNTER — OFFICE VISIT (OUTPATIENT)
Dept: URGENT CARE | Facility: CLINIC | Age: 73
End: 2023-02-28

## 2023-02-28 VITALS
RESPIRATION RATE: 18 BRPM | SYSTOLIC BLOOD PRESSURE: 106 MMHG | TEMPERATURE: 97.5 F | OXYGEN SATURATION: 99 % | HEART RATE: 72 BPM | DIASTOLIC BLOOD PRESSURE: 60 MMHG

## 2023-02-28 DIAGNOSIS — S80.02XA HEMATOMA OF LEFT KNEE REGION: Primary | ICD-10-CM

## 2023-02-28 DIAGNOSIS — S89.92XA INJURY OF LEFT KNEE, INITIAL ENCOUNTER: ICD-10-CM

## 2023-02-28 DIAGNOSIS — S99.922A INJURY OF LEFT FOOT, INITIAL ENCOUNTER: ICD-10-CM

## 2023-02-28 NOTE — PROGRESS NOTES
[facility]        NAME: Aleksey Reed is a 67 y o  male  : 1950    MRN: [id number]  DATE: 2023  TIME: 2:09 PM    Assessment and Plan   Hematoma of left knee region [S80 02XA]  1  Hematoma of left knee region        2  Injury of left knee, initial encounter  XR tibia fibula 2 vw left    XR knee 4+ vw left injury      3  Injury of left foot, initial encounter  XR foot 3+ vw left        Left knee xray reviewed: Not acute abnormalities noted  Left tib/fib xray reviewed: No acute abnormalities noted  Left foot xray reviewed: No acute abnormalities noted    Patient Instructions     Ice 20 minutes 3-4 times per day  Insulate the skin from the ice to prevent frostbite  Apply warm compressed to affected area  Tylenol/ibuprofen as needed  Rest and Elevate  Follow up with orthopedic if symptoms do not improve  Follow up with PCP in 3-5 days  Proceed to ER if symptoms worsen  Chief Complaint     Chief Complaint   Patient presents with   • Fall     Left foot and knee pain had a fall last night pushing wife in wheelchair          History of Present Illness       Patient is a 68 yo male with significant PMH CHF and AFIB presenting in the clinic today for left knee and foot pain  Patient notes he was transferring his wife last night in her wheelchair when the wheelchair fell over and on top of his right lowe leg  Patient locates his pain to the left knee and the medial aspect of the left foot  Admits abrasion to the lateral aspect of the left knee  Admits bruising and swelling of the medial aspect of his left knee  Admits swelling of the left hallux  Denies numbness, tingling, fever, chills, chest pain, and SOB  Admits the use of oxycodone for symptom management  Patient mentions he is the main caregiver for his wife who is wheelchair bound  Patient takes Eliquis daily for h/o AFIB  Review of Systems   Review of Systems   Constitutional: Negative for chills and fever     Respiratory: Negative for shortness of breath  Cardiovascular: Negative for chest pain  Musculoskeletal: Positive for arthralgias  Negative for joint swelling  Skin: Positive for wound  Negative for rash           Current Medications       Current Outpatient Medications:   •  albuterol (PROVENTIL HFA,VENTOLIN HFA) 90 mcg/act inhaler, Inhale 2 puffs every 6 (six) hours as needed for wheezing, Disp: , Rfl:   •  amLODIPine (NORVASC) 2 5 mg tablet, , Disp: , Rfl:   •  carvedilol (COREG) 25 mg tablet, , Disp: , Rfl:   •  COENZYME Q10-OMEGA 3 FATTY ACD PO, Take 2,000 mg by mouth daily, Disp: , Rfl:   •  ELIQUIS 5 MG, Take 5 mg by mouth 2 (two) times a day , Disp: , Rfl:   •  esomeprazole (NexIUM) 20 mg capsule, Take 1 capsule (20 mg total) by mouth daily in the early morning Take 1/2 hour before breakfast, Disp: 60 capsule, Rfl: 2  •  Flaxseed, Linseed, (FLAXSEED OIL) 1000 MG CAPS, Take 2,000 mg by mouth daily, Disp: , Rfl:   •  fluticasone (FLONASE) 50 mcg/act nasal spray, 2 sprays into each nostril daily, Disp: 1 Bottle, Rfl: 1  •  levothyroxine 112 mcg tablet, Take 112 mcg by mouth daily , Disp: , Rfl:   •  lisinopril (ZESTRIL) 40 mg tablet, Take 1 tablet (40 mg total) by mouth daily, Disp: , Rfl: 0  •  loratadine (CLARITIN) 10 mg tablet, Take 10 mg by mouth daily, Disp: , Rfl:   •  Melatonin 10 MG TABS, Take by mouth, Disp: , Rfl:   •  ondansetron (Zofran ODT) 4 mg disintegrating tablet, Take 1 tablet (4 mg total) by mouth every 6 (six) hours as needed for nausea or vomiting (Patient not taking: Reported on 11/26/2022), Disp: 20 tablet, Rfl: 0  •  simvastatin (ZOCOR) 20 mg tablet, Take 20 mg by mouth daily at bedtime , Disp: , Rfl:   •  SYMBICORT 160-4 5 MCG/ACT inhaler, Inhale 2 puffs 2 (two) times a day , Disp: , Rfl:   •  TiZANidine (ZANAFLEX) 2 MG capsule, Take 1 capsule (2 mg total) by mouth daily at bedtime as needed for muscle spasms, Disp: 7 capsule, Rfl: 0  •  travoprost (TRAVATAN-Z) 0 004 % ophthalmic solution, 1 drop daily at bedtime, Disp: , Rfl:   •  zolpidem (AMBIEN) 5 mg tablet, , Disp: , Rfl:     Current Allergies     Allergies as of 02/28/2023 - Reviewed 02/28/2023   Allergen Reaction Noted   • Procardia [nifedipine] Palpitations 03/25/2019            The following portions of the patient's history were reviewed and updated as appropriate: allergies, current medications, past family history, past medical history, past social history, past surgical history and problem list      Past Medical History:   Diagnosis Date   • LAKISHA (acute kidney injury) ([facility] [address] ) 10/01/2019   • Allergic rhinitis    • Arthritis    • Asthma    • Atrial fibrillation (HCC)    • Chronic anticoagulation    • Chronic kidney disease    • Colon polyps    • Coronary artery disease    • CPAP (continuous positive airway pressure) dependence     bi pap   • Disease of thyroid gland    • GERD (gastroesophageal reflux disease)    • Hyperlipidemia    • Hypertrophic cardiomegaly    • Irregular heart beat     a fib   • Pneumonia    • Seasonal allergies    • Sleep apnea        Past Surgical History:   Procedure Laterality Date   • CARDIAC DEFIBRILLATOR PLACEMENT  2018   • CATARACT EXTRACTION  2019   • CATARACT EXTRACTION W/ INTRAOCULAR LENS IMPLANT Right 04/09/2019    Procedure: EXCHANGE OF IOL;  Surgeon: Horacio Guevara MD;  Location: [address] [place] [address];  Service: Ophthalmology   • COLONOSCOPY      q 3 years   • EGD     • FRACTURE SURGERY  1969   • Elihu Meenakshi / Landy Barn / Arianna Steele     • [place] XCAPSL CTRC RMVL INSJ IO LENS PROSTH W/O ECP Right 03/26/2019    Procedure: PHACO W/IOL & LRI;  Surgeon: Horacio Guevara MD;  Location: [address] [place] [address] OR;  Service: Ophthalmology   • [place] XCAPSL CTRC RMVL INSJ IO LENS PROSTH W/O ECP Left 05/07/2019    Procedure: PHACO W/IOL & LRI;  Surgeon: Horacio Guevara MD;  Location: [address] [place] [address];  Service: Ophthalmology   • TONSILLECTOMY         Family History   Problem Relation Age of Onset   • Colon cancer Mother          Medications have been verified  Objective   /60   Pulse 72   Temp 97 5 °F (36 4 °C)   Resp 18   SpO2 99%        Physical Exam     Physical Exam  Vitals reviewed  Constitutional:       General: He is not in acute distress  Appearance: Normal appearance  He is normal weight  He is not ill-appearing  HENT:      Head: Normocephalic  Nose: Nose normal       Mouth/Throat:      Mouth: Mucous membranes are moist    Eyes:      Conjunctiva/sclera: Conjunctivae normal    Cardiovascular:      Rate and Rhythm: Normal rate and regular rhythm  Pulses: Normal pulses  Heart sounds: Normal heart sounds  No murmur heard  No friction rub  No gallop  Pulmonary:      Effort: Pulmonary effort is normal       Breath sounds: Normal breath sounds  No wheezing, rhonchi or rales  Musculoskeletal:         General: Swelling present  Normal range of motion  Right knee: Normal       Left knee: Swelling, erythema and ecchymosis present  No bony tenderness  Normal range of motion  Tenderness present over the medial joint line  Right lower leg: Normal       Left lower leg: Swelling and tenderness (medial proximal aspect) present  No bony tenderness  1+ Edema present  Right ankle: Normal       Left ankle: Normal       Right foot: Normal       Left foot: Normal range of motion  Bony tenderness (hallux) present  No swelling, deformity, laceration or tenderness  Normal pulse  Legs:    Skin:     General: Skin is warm  Capillary Refill: Capillary refill takes less than 2 seconds  Neurological:      Mental Status: He is alert        Gait: Gait normal    Psychiatric:         Mood and Affect: Mood normal          Behavior: Behavior normal

## 2023-02-28 NOTE — PATIENT INSTRUCTIONS
Ice 20 minutes 3-4 times per day  Insulate the skin from the ice to prevent frostbite  Apply warm compressed to affected area  Tylenol/ibuprofen as needed  Rest and Elevate  Follow up with orthopedic if symptoms do not improve  Follow up with PCP in 3-5 days  Proceed to ER if symptoms worsen

## 2023-08-12 ENCOUNTER — OFFICE VISIT (OUTPATIENT)
Dept: URGENT CARE | Age: 73
End: 2023-08-12
Payer: MEDICARE

## 2023-08-12 VITALS
DIASTOLIC BLOOD PRESSURE: 71 MMHG | OXYGEN SATURATION: 99 % | TEMPERATURE: 98 F | RESPIRATION RATE: 18 BRPM | HEIGHT: 70 IN | HEART RATE: 68 BPM | BODY MASS INDEX: 26.48 KG/M2 | SYSTOLIC BLOOD PRESSURE: 114 MMHG | WEIGHT: 185 LBS

## 2023-08-12 DIAGNOSIS — L03.031 CELLULITIS OF GREAT TOE OF RIGHT FOOT: Primary | ICD-10-CM

## 2023-08-12 PROCEDURE — 99213 OFFICE O/P EST LOW 20 MIN: CPT | Performed by: PHYSICIAN ASSISTANT

## 2023-08-12 PROCEDURE — G0463 HOSPITAL OUTPT CLINIC VISIT: HCPCS | Performed by: PHYSICIAN ASSISTANT

## 2023-08-12 RX ORDER — CLINDAMYCIN HYDROCHLORIDE 300 MG/1
300 CAPSULE ORAL 3 TIMES DAILY
Qty: 30 CAPSULE | Refills: 0 | Status: SHIPPED | OUTPATIENT
Start: 2023-08-12 | End: 2023-08-22

## 2023-08-12 NOTE — PROGRESS NOTES
[facility]        NAME: Rosy Boss is a 75 y.o. male  : 1950    MRN: [id number]  DATE: 2023  TIME: 1:33 PM    /71   Pulse 68   Temp 98 °F (36.7 °C)   Resp 18   Ht 5' 10" (1.778 m)   Wt 83.9 kg (185 lb)   SpO2 99%   BMI 26.54 kg/m²     Assessment and Plan   Cellulitis of great toe of right foot [L03.031]  1. Cellulitis of great toe of right foot  clindamycin (CLEOCIN) 300 MG capsule            Patient Instructions       Follow up with PCP in 3-5 days. Proceed to  ER if symptoms worsen. Chief Complaint     Chief Complaint   Patient presents with   • Toe problem     Wound on right great toe x 2 weeks         History of Present Illness       Pt with right great toe redness and swelling , pt had pedicure, 2 weeks ago, they accidentally cut some skin with nail on right great toe, still with some redness and slight swelling       Review of Systems   Review of Systems   Constitutional: Negative. HENT: Negative. Eyes: Negative. Respiratory: Negative. Cardiovascular: Negative. Gastrointestinal: Negative. Endocrine: Negative. Genitourinary: Negative. Musculoskeletal: Negative. Skin: Negative. Allergic/Immunologic: Negative. Neurological: Negative. Hematological: Negative. Psychiatric/Behavioral: Negative. All other systems reviewed and are negative.         Current Medications       Current Outpatient Medications:   •  albuterol (PROVENTIL HFA,VENTOLIN HFA) 90 mcg/act inhaler, Inhale 2 puffs every 6 (six) hours as needed for wheezing, Disp: , Rfl:   •  amLODIPine (NORVASC) 2.5 mg tablet, , Disp: , Rfl:   •  carvedilol (COREG) 25 mg tablet, , Disp: , Rfl:   •  clindamycin (CLEOCIN) 300 MG capsule, Take 1 capsule (300 mg total) by mouth 3 (three) times a day for 10 days, Disp: 30 capsule, Rfl: 0  •  COENZYME Q10-OMEGA 3 FATTY ACD PO, Take 2,000 mg by mouth daily, Disp: , Rfl:   •  ELIQUIS 5 MG, Take 5 mg by mouth 2 (two) times a day , Disp: , Rfl:   •  esomeprazole (NexIUM) 20 mg capsule, Take 1 capsule (20 mg total) by mouth daily in the early morning Take 1/2 hour before breakfast, Disp: 60 capsule, Rfl: 2  •  Flaxseed, Linseed, (FLAXSEED OIL) 1000 MG CAPS, Take 2,000 mg by mouth daily, Disp: , Rfl:   •  fluticasone (FLONASE) 50 mcg/act nasal spray, 2 sprays into each nostril daily, Disp: 1 Bottle, Rfl: 1  •  levothyroxine 112 mcg tablet, Take 112 mcg by mouth daily , Disp: , Rfl:   •  lisinopril (ZESTRIL) 40 mg tablet, Take 1 tablet (40 mg total) by mouth daily, Disp: , Rfl: 0  •  loratadine (CLARITIN) 10 mg tablet, Take 10 mg by mouth daily, Disp: , Rfl:   •  Melatonin 10 MG TABS, Take by mouth, Disp: , Rfl:   •  simvastatin (ZOCOR) 20 mg tablet, Take 20 mg by mouth daily at bedtime , Disp: , Rfl:   •  SYMBICORT 160-4.5 MCG/ACT inhaler, Inhale 2 puffs 2 (two) times a day , Disp: , Rfl:   •  TiZANidine (ZANAFLEX) 2 MG capsule, Take 1 capsule (2 mg total) by mouth daily at bedtime as needed for muscle spasms, Disp: 7 capsule, Rfl: 0  •  zolpidem (AMBIEN) 5 mg tablet, , Disp: , Rfl:   •  ondansetron (Zofran ODT) 4 mg disintegrating tablet, Take 1 tablet (4 mg total) by mouth every 6 (six) hours as needed for nausea or vomiting (Patient not taking: Reported on 11/26/2022), Disp: 20 tablet, Rfl: 0  •  travoprost (TRAVATAN-Z) 0.004 % ophthalmic solution, 1 drop daily at bedtime, Disp: , Rfl:     Current Allergies     Allergies as of 08/12/2023 - Reviewed 08/12/2023   Allergen Reaction Noted   • Procardia [nifedipine] Palpitations 03/25/2019            The following portions of the patient's history were reviewed and updated as appropriate: allergies, current medications, past family history, past medical history, past social history, past surgical history and problem list.     Past Medical History:   Diagnosis Date   • LAKISHA (acute kidney injury) ([address]) 10/01/2019   • Allergic rhinitis    • Arthritis    • Asthma    • Atrial fibrillation ([address])    • Chronic anticoagulation    • Chronic kidney disease    • Colon polyps    • Coronary artery disease    • CPAP (continuous positive airway pressure) dependence     bi pap   • Disease of thyroid gland    • GERD (gastroesophageal reflux disease)    • Hyperlipidemia    • Hypertrophic cardiomegaly    • Irregular heart beat     a fib   • Pneumonia    • Seasonal allergies    • Sleep apnea        Past Surgical History:   Procedure Laterality Date   • CARDIAC DEFIBRILLATOR PLACEMENT  2018   • CATARACT EXTRACTION  2019   • CATARACT EXTRACTION W/ INTRAOCULAR LENS IMPLANT Right 04/09/2019    Procedure: EXCHANGE OF IOL;  Surgeon: Blayne Giron MD;  Location: [address] [place] [address] OR;  Service: Ophthalmology   • COLONOSCOPY      q 3 years   • EGD     • FRACTURE SURGERY  1969   • INSERT / Katarina Costello / Maria Dolores Munoz     • DC XCAPSL CTRC RMVL INSJ IO LENS PROSTH W/O ECP Right 03/26/2019    Procedure: PHACO W/IOL & LRI;  Surgeon: Blayne Giron MD;  Location: [address] [place] [address] OR;  Service: Ophthalmology   • DC XCAPSL CTRC RMVL INSJ IO LENS PROSTH W/O ECP Left 05/07/2019    Procedure: PHACO W/IOL & LRI;  Surgeon: Blayne Giron MD;  Location: [address] [place] [address] OR;  Service: Ophthalmology   • TONSILLECTOMY         Family History   Problem Relation Age of Onset   • Colon cancer Mother          Medications have been verified. Objective   /71   Pulse 68   Temp 98 °F (36.7 °C)   Resp 18   Ht 5' 10" (1.778 m)   Wt 83.9 kg (185 lb)   SpO2 99%   BMI 26.54 kg/m²        Physical Exam     Physical Exam  Vitals and nursing note reviewed. Constitutional:       Appearance: Normal appearance. He is normal weight. Comments: Pt states his dtap is utd    Cardiovascular:      Rate and Rhythm: Normal rate and regular rhythm. Pulses: Normal pulses. Heart sounds: Normal heart sounds. Pulmonary:      Effort: Pulmonary effort is normal.      Breath sounds: Normal breath sounds. Abdominal:      General: Abdomen is flat.  Bowel sounds are normal. Palpations: Abdomen is soft. Musculoskeletal:         General: Normal range of motion. Cervical back: Normal range of motion and neck supple. Comments: Right great toe  Minor medial erythema and swelling  Nail with some blood underneath mininmal  Toe tip sensaton wnl, other toes and foot wnl   From all joints    Neurological:      Mental Status: He is alert and oriented to person, place, and time.

## 2023-08-21 ENCOUNTER — OFFICE VISIT (OUTPATIENT)
Dept: URGENT CARE | Age: 73
End: 2023-08-21
Payer: MEDICARE

## 2023-08-21 VITALS
HEART RATE: 80 BPM | SYSTOLIC BLOOD PRESSURE: 109 MMHG | RESPIRATION RATE: 18 BRPM | OXYGEN SATURATION: 97 % | TEMPERATURE: 98 F | DIASTOLIC BLOOD PRESSURE: 71 MMHG

## 2023-08-21 DIAGNOSIS — R09.81 NASAL CONGESTION: ICD-10-CM

## 2023-08-21 DIAGNOSIS — B34.9 VIRAL SYNDROME: Primary | ICD-10-CM

## 2023-08-21 PROCEDURE — 99213 OFFICE O/P EST LOW 20 MIN: CPT

## 2023-08-21 PROCEDURE — G0463 HOSPITAL OUTPT CLINIC VISIT: HCPCS

## 2023-08-21 RX ORDER — AZELASTINE 1 MG/ML
1 SPRAY, METERED NASAL 2 TIMES DAILY
Qty: 30 ML | Refills: 0 | Status: SHIPPED | OUTPATIENT
Start: 2023-08-21

## 2023-08-21 NOTE — PATIENT INSTRUCTIONS
No signs of bacterial infection on exam today. Signs & symptoms consistent with viral illness. Declined COVID testing. OTC medications & supportive care as directed. Discussed avoiding Afrin nightly. Resume Flonase or Astelin as directed. Most viral illnesses last about 7-10 days with days 3-5 being the worst in severity of symptoms. If symptoms persist past 10 days, follow up with PCP. If symptoms worsen, proceed to the ER.

## 2023-08-21 NOTE — PROGRESS NOTES
[facility]        NAME: Valery  is a 75 y.o. male  : 1950    MRN: [id number]  DATE: 2023  TIME: 1:17 PM    Assessment and Plan   Viral syndrome [B34.9]  1. Viral syndrome        2. Nasal congestion  azelastine (ASTELIN) 0.1 % nasal spray            Patient Instructions     HA, congestion & cough x 1 day. No signs of bacterial infection on exam today. Declined COVID testing. Discussed OTC meds & supportive care. Recommended switching to Flonase or Astelin and avoiding daily Afrin use. Pt states Flonase has not worked in the past.  Follow up with PCP in 2-3 days. Proceed to ER if symptoms worsen. Chief Complaint     Chief Complaint   Patient presents with   • Headache     Pt c/o HA, congestion, cough. Sx for one day. OTC cough syrup. History of Present Illness     75 y.o. M  Significant heart history  HA, congestion & cough x 1 day  Denies CP or SOB  No fevers  Denies sick contacts  Lives in [address] [place] [address] and flu medicine OTC  No recent travel  Using Afrin daily     Review of Systems   Review of Systems   Constitutional: Negative for chills, fatigue and fever. HENT: Positive for congestion. Negative for ear discharge, ear pain, facial swelling, rhinorrhea, sinus pressure, sinus pain, sore throat and trouble swallowing. Eyes: Negative for photophobia, pain and visual disturbance. Respiratory: Positive for cough. Negative for chest tightness, shortness of breath and wheezing. Cardiovascular: Negative for chest pain, palpitations and leg swelling. Gastrointestinal: Negative for abdominal pain, diarrhea, nausea and vomiting. Genitourinary: Negative for dysuria, flank pain and hematuria. Musculoskeletal: Negative for arthralgias, back pain, myalgias and neck pain. Skin: Negative for pallor and wound. Neurological: Positive for headaches. Negative for dizziness, syncope, weakness and numbness.    Psychiatric/Behavioral: Negative for confusion and sleep disturbance. All other systems reviewed and are negative.         Current Medications       Current Outpatient Medications:   •  albuterol (PROVENTIL HFA,VENTOLIN HFA) 90 mcg/act inhaler, Inhale 2 puffs every 6 (six) hours as needed for wheezing, Disp: , Rfl:   •  amLODIPine (NORVASC) 2.5 mg tablet, , Disp: , Rfl:   •  azelastine (ASTELIN) 0.1 % nasal spray, 1 spray into each nostril 2 (two) times a day Use in each nostril as directed, Disp: 30 mL, Rfl: 0  •  carvedilol (COREG) 25 mg tablet, , Disp: , Rfl:   •  clindamycin (CLEOCIN) 300 MG capsule, Take 1 capsule (300 mg total) by mouth 3 (three) times a day for 10 days, Disp: 30 capsule, Rfl: 0  •  COENZYME Q10-OMEGA 3 FATTY ACD PO, Take 2,000 mg by mouth daily, Disp: , Rfl:   •  ELIQUIS 5 MG, Take 5 mg by mouth 2 (two) times a day , Disp: , Rfl:   •  esomeprazole (NexIUM) 20 mg capsule, Take 1 capsule (20 mg total) by mouth daily in the early morning Take 1/2 hour before breakfast, Disp: 60 capsule, Rfl: 2  •  Flaxseed, Linseed, (FLAXSEED OIL) 1000 MG CAPS, Take 2,000 mg by mouth daily, Disp: , Rfl:   •  fluticasone (FLONASE) 50 mcg/act nasal spray, 2 sprays into each nostril daily, Disp: 1 Bottle, Rfl: 1  •  levothyroxine 112 mcg tablet, Take 112 mcg by mouth daily , Disp: , Rfl:   •  lisinopril (ZESTRIL) 40 mg tablet, Take 1 tablet (40 mg total) by mouth daily, Disp: , Rfl: 0  •  loratadine (CLARITIN) 10 mg tablet, Take 10 mg by mouth daily, Disp: , Rfl:   •  Melatonin 10 MG TABS, Take by mouth, Disp: , Rfl:   •  ondansetron (Zofran ODT) 4 mg disintegrating tablet, Take 1 tablet (4 mg total) by mouth every 6 (six) hours as needed for nausea or vomiting (Patient not taking: Reported on 11/26/2022), Disp: 20 tablet, Rfl: 0  •  simvastatin (ZOCOR) 20 mg tablet, Take 20 mg by mouth daily at bedtime , Disp: , Rfl:   •  SYMBICORT 160-4.5 MCG/ACT inhaler, Inhale 2 puffs 2 (two) times a day , Disp: , Rfl:   •  TiZANidine (ZANAFLEX) 2 MG capsule, Take 1 capsule (2 mg total) by mouth daily at bedtime as needed for muscle spasms, Disp: 7 capsule, Rfl: 0  •  travoprost (TRAVATAN-Z) 0.004 % ophthalmic solution, 1 drop daily at bedtime, Disp: , Rfl:   •  zolpidem (AMBIEN) 5 mg tablet, , Disp: , Rfl:     Current Allergies     Allergies as of 08/21/2023 - Reviewed 08/21/2023   Allergen Reaction Noted   • Procardia [nifedipine] Palpitations 03/25/2019            The following portions of the patient's history were reviewed and updated as appropriate: allergies, current medications, past family history, past medical history, past social history, past surgical history and problem list.     Past Medical History:   Diagnosis Date   • LAKISHA (acute kidney injury) ([address]) 10/01/2019   • Allergic rhinitis    • Arthritis    • Asthma    • Atrial fibrillation (HCC)    • Chronic anticoagulation    • Chronic kidney disease    • Colon polyps    • Coronary artery disease    • CPAP (continuous positive airway pressure) dependence     bi pap   • Disease of thyroid gland    • GERD (gastroesophageal reflux disease)    • Hyperlipidemia    • Hypertrophic cardiomegaly    • Irregular heart beat     a fib   • Pneumonia    • Seasonal allergies    • Sleep apnea        Past Surgical History:   Procedure Laterality Date   • CARDIAC DEFIBRILLATOR PLACEMENT  2018   • CATARACT EXTRACTION  2019   • CATARACT EXTRACTION W/ INTRAOCULAR LENS IMPLANT Right 04/09/2019    Procedure: EXCHANGE OF IOL;  Surgeon: Ladonna Lloyd MD;  Location: [address] [place] [address];  Service: Ophthalmology   • COLONOSCOPY      q 3 years   • EGD     • FRACTURE SURGERY  1969   • Karen Gip / Galindo Sanders / Armin Go     • [place] XCAPSL CTRC RMVL INSJ IO LENS PROSTH W/O ECP Right 03/26/2019    Procedure: DR LOW DELAROSA [facility] W/IOL & LRI;  Surgeon: Ladonna Lloyd MD;  Location: [address] [address] OR;  Service: Ophthalmology   • KS XCAPSL CTRC RMVL INSJ IO LENS PROSTH W/O ECP Left 05/07/2019    Procedure: DR LOW DELAROSA [facility] W/IOL & LRI;  Surgeon: Ladonna Lloyd MD;  Location: [facility];  Service: Ophthalmology   • TONSILLECTOMY         Family History   Problem Relation Age of Onset   • Colon cancer Mother          Medications have been verified. Objective   /71   Pulse 80   Temp 98 °F (36.7 °C) (Tympanic)   Resp 18   SpO2 97%   No LMP for male patient. Physical Exam     Physical Exam  Vitals reviewed. Constitutional:       General: He is not in acute distress. Appearance: He is normal weight. He is not ill-appearing or toxic-appearing. Comments: WNWD; NAD   HENT:      Head: Normocephalic. Right Ear: Tympanic membrane normal. No middle ear effusion. Tympanic membrane is not erythematous or bulging. Left Ear: Tympanic membrane normal.  No middle ear effusion. Tympanic membrane is not erythematous or bulging. Nose: Nose normal.      Right Sinus: No maxillary sinus tenderness or frontal sinus tenderness. Left Sinus: No maxillary sinus tenderness or frontal sinus tenderness. Comments: No purulent drainage     Mouth/Throat:      Mouth: Mucous membranes are moist.      Pharynx: Uvula midline. No oropharyngeal exudate, posterior oropharyngeal erythema or uvula swelling. Tonsils: No tonsillar exudate or tonsillar abscesses. Comments:   No erythema, swelling or exudate  Mild PND with clear drainage  Eyes:      Extraocular Movements: Extraocular movements intact. Conjunctiva/sclera: Conjunctivae normal.      Pupils: Pupils are equal, round, and reactive to light. Cardiovascular:      Rate and Rhythm: Normal rate and regular rhythm. Pulses: Normal pulses. Heart sounds: Normal heart sounds. Pulmonary:      Effort: Pulmonary effort is normal. No tachypnea or respiratory distress. Breath sounds: Normal breath sounds and air entry. No decreased breath sounds, wheezing, rhonchi or rales. Comments:   CTAB no wheezing or stridor  No cough on exam  Abdominal:      General: Bowel sounds are normal.      Palpations: Abdomen is soft. Tenderness: There is no abdominal tenderness. Musculoskeletal:         General: Normal range of motion. Cervical back: Normal range of motion and neck supple. Lymphadenopathy:      Cervical: Cervical adenopathy present. Skin:     General: Skin is warm and dry. Capillary Refill: Capillary refill takes less than 2 seconds. Neurological:      General: No focal deficit present. Mental Status: He is alert. Cranial Nerves: No cranial nerve deficit. Sensory: Sensation is intact. Motor: Motor function is intact. Coordination: Coordination is intact. Deep Tendon Reflexes: Reflexes are normal and symmetric. Psychiatric:         Behavior: Behavior is cooperative.

## 2023-10-11 ENCOUNTER — OFFICE VISIT (OUTPATIENT)
Dept: GASTROENTEROLOGY | Facility: CLINIC | Age: 73
End: 2023-10-11
Payer: MEDICARE

## 2023-10-11 VITALS
WEIGHT: 194 LBS | HEART RATE: 66 BPM | HEIGHT: 70 IN | BODY MASS INDEX: 27.77 KG/M2 | SYSTOLIC BLOOD PRESSURE: 147 MMHG | DIASTOLIC BLOOD PRESSURE: 93 MMHG

## 2023-10-11 DIAGNOSIS — I48.0 PAROXYSMAL ATRIAL FIBRILLATION (HCC): ICD-10-CM

## 2023-10-11 DIAGNOSIS — R10.30 LOWER ABDOMINAL PAIN: ICD-10-CM

## 2023-10-11 DIAGNOSIS — K57.90 DIVERTICULOSIS: ICD-10-CM

## 2023-10-11 DIAGNOSIS — D64.9 ANEMIA, UNSPECIFIED TYPE: Primary | ICD-10-CM

## 2023-10-11 DIAGNOSIS — K21.9 GASTROESOPHAGEAL REFLUX DISEASE WITHOUT ESOPHAGITIS: ICD-10-CM

## 2023-10-11 DIAGNOSIS — K63.5 POLYP OF COLON, UNSPECIFIED PART OF COLON, UNSPECIFIED TYPE: ICD-10-CM

## 2023-10-11 PROCEDURE — 99213 OFFICE O/P EST LOW 20 MIN: CPT | Performed by: NURSE PRACTITIONER

## 2023-10-11 RX ORDER — ALPRAZOLAM 0.5 MG/1
0.5 TABLET ORAL DAILY PRN
COMMUNITY
Start: 2023-05-16

## 2023-10-11 NOTE — PROGRESS NOTES
[facility] - Outpatient Follow-up Note  Cielo Flores Sr. 75 y.o. male MRN: [id number]  Encounter: [id number]          ASSESSMENT AND PLAN:      1. Anemia, unspecified type  2. Paroxysmal atrial fibrillation (HCC)  3. History of CKD 3  Pt with down trending Hgb 11.8 on most recent labs in May, MCV normal with no overt evidence of GI bleeding. Pt denies hematuria, nose bleeds, hematochezia, melena, hematemesis. He is on Eliquis for hx Afib which he reports was started about a year ago. He also has hx of CKD 3. Anemia may be due to chronic disease, r/o occult GI blood loss from AVM, PUD. Pt will obtain FIT test, iron panel, and repeat CBC (ordered by PCP). EGD/Colonoscopy ordrered which pt will call to schedule. He is living at an assisted living now and needs to discuss with their transport team. He also is requesting a later apt as he helps his wife get ready in the morning. Miralax/Dulcolax prep will be used. May need capsule pending course  -     Occult Blood, Fecal Immunochemical; Future  -     Colonoscopy; Future; Expected date: 10/11/2023  -     EGD; Future; Expected date: 10/11/2023  -     Iron Panel (Includes Ferritin, Iron Sat%, Iron, and TIBC); Future    4. Gastroesophageal reflux disease without esophagitis  Controlled on Nexium 20mg daily. Last EGD June 2022 showing small hiatal hernia, bx negative for Read's. Prior evidence of gastroparesis not present at that time. Due to new anemia will obtain repeat EGD with colonoscopy as above. 5. Lower abdominal pain  6. Diverticulosis  Pt reports lower abdominal pain x1 month, constant ache, denies any improvement/worsening with BMs, no changes in bowel habit. Denies any urinary symptoms or noticeable  hernias. Does report he often has to lift/help his wife.  He has not had any recent abdominal imaging so will obtain CT scan for further evaluation with PO contrast only given CKD to ensure no underlying inflammation prior to colonoscopy. -     Colonoscopy; Future; Expected date: 10/11/2023  -     CT abdomen pelvis wo contrast; Future; Expected date: 10/11/2023    7. Polyp of colon, unspecified part of colon, unspecified type  Up to date on colon cancer screening. Last colonoscopy in June 2022 with 3 TA polyps removed. He has a family hx of colon cancer in his mother. Due to new onset anemia will repeat colonoscopy at this time. ______________________________________________________________________    SUBJECTIVE:  Wilder Palomodayron Kay is a 75 y.o. male with history of Afib on Eliquis, HTN, HLD, HCM s/p ICD, CKD, JUANI presenting for consultation to colonoscopy referred by cardiologist due to drop in Hgb. Hgb 11.8 on most recent labs done in May. He denies any obvious bleeding. Bowel movements are regular with occasional constipation relieved with Miralax. Reflux is controlled on Nexium. Denies any N/V, dysphagia/odynophagia, weight loss. He does report new onset lower abdominal pain starting about a month ago. Denies any urinary symptoms, changes in bowel habits. Pain doesn't improve or worsen with BM or movement. Just a constant ache. He is now living at an assisted living with his wife & continues to be responsible for lifting her/personal care. Wants apt after 7am when transport Nilesh Paul starts and in the late morning/afternoon because he takes care of his wife in the morning. REVIEW OF SYSTEMS IS OTHERWISE NEGATIVE.       Historical Information   Past Medical History:   Diagnosis Date    LAKISHA (acute kidney injury) ([address]) 10/01/2019    Allergic rhinitis     Arthritis     Asthma     Atrial fibrillation (HCC)     Chronic anticoagulation     Chronic kidney disease     Colon polyps     Coronary artery disease     CPAP (continuous positive airway pressure) dependence     bi pap    Disease of thyroid gland     GERD (gastroesophageal reflux disease)     Hyperlipidemia     Hypertension     Hypertrophic cardiomegaly Irregular heart beat     a fib    Pneumonia     Seasonal allergies     Sleep apnea      Past Surgical History:   Procedure Laterality Date    CARDIAC DEFIBRILLATOR PLACEMENT  2018    CATARACT EXTRACTION  2019    CATARACT EXTRACTION W/ INTRAOCULAR LENS IMPLANT Right 04/09/2019    Procedure: EXCHANGE OF IOL;  Surgeon: Matthew Nevarez MD;  Location: [address] [place] [address] OR;  Service: Ophthalmology    COLONOSCOPY      q 3 years    EGD      FRACTURE SURGERY  1969    INSERT / Daphane Aminta / [place] [facility] W/O ECP Right 03/26/2019    Procedure: PHACO W/IOL & LRI;  Surgeon: Matthew Nevarez MD;  Location: [address] [place] [address] OR;  Service: Ophthalmology    FL XCAPSL CTRC RMVL INSJ IO LENS PROSTH W/O ECP Left 05/07/2019    Procedure: PHACO W/IOL & LRI;  Surgeon: Matthew Nevarez MD;  Location: [address] [place] [address] OR;  Service: Ophthalmology    TONSILLECTOMY       Social History   Social History     Substance and Sexual Activity   Alcohol Use Yes     Social History     Substance and Sexual Activity   Drug Use Never     Social History     Tobacco Use   Smoking Status Never   Smokeless Tobacco Never     Family History   Problem Relation Age of Onset    Colon cancer Mother        Meds/Allergies       Current Outpatient Medications:     albuterol (PROVENTIL HFA,VENTOLIN HFA) 90 mcg/act inhaler    ALPRAZolam (XANAX) 0.5 mg tablet    azelastine (ASTELIN) 0.1 % nasal spray    carvedilol (COREG) 25 mg tablet    COENZYME Q10-OMEGA 3 FATTY ACD PO    ELIQUIS 5 MG    esomeprazole (NexIUM) 20 mg capsule    Flaxseed, Linseed, (FLAXSEED OIL) 1000 MG CAPS    fluticasone (FLONASE) 50 mcg/act nasal spray    levothyroxine 112 mcg tablet    lisinopril (ZESTRIL) 40 mg tablet    loratadine (CLARITIN) 10 mg tablet    Melatonin 10 MG TABS    ondansetron (Zofran ODT) 4 mg disintegrating tablet    simvastatin (ZOCOR) 20 mg tablet    SYMBICORT 160-4.5 MCG/ACT inhaler    TiZANidine (ZANAFLEX) 2 MG capsule    travoprost (TRAVATAN-Z) 0.004 % ophthalmic solution zolpidem (AMBIEN) 5 mg tablet    amLODIPine (NORVASC) 2.5 mg tablet    Allergies   Allergen Reactions    Procardia [Nifedipine] Palpitations           Objective     Blood pressure 147/93, pulse 66, height 5' 10" (1.778 m), weight 88 kg (194 lb). Body mass index is 27.84 kg/m². PHYSICAL EXAM:      General Appearance:   Alert, cooperative, no distress   HEENT:   Normocephalic, atraumatic, anicteric. Neck:  Supple, symmetrical, trachea midline   Lungs:   Clear to auscultation bilaterally; no rales, rhonchi or wheezing; respirations unlabored    Heart[de-identified]   Regular rate and rhythm; no murmur. Abdomen:   Soft, non-tender, non-distended; normal bowel sounds; no masses, no organomegaly    Genitalia:   Deferred    Rectal:   Deferred    Extremities:  No cyanosis, clubbing or edema    Skin:  No jaundice, rashes, or lesions    Lymph nodes:  No palpable cervical lymphadenopathy        Lab Results:   No visits with results within 1 Day(s) from this visit. Latest known visit with results is:   Office Visit on 12/29/2022   Component Date Value    SARS-CoV-2 12/29/2022 Negative     INFLUENZA A PCR 12/29/2022 Negative     INFLUENZA B PCR 12/29/2022 Negative          Radiology Results:   ECHO 2D COMPLETE    Result Date: 9/20/2023  Narrative: This result has an attachment that is not available. Preserved global ejection fraction   Mild concentric left ventricle hypertrophy. Possible apical hypertrophy   Mild aortic insufficiency   Mildly dilated aortic root   Mild mitral regurgitation   Left atrial enlargement   Mild tricuspid regurgitation   Mildly dilated right atrium   Mild pulmonic insufficiency Left Ventricle Left ventricle is normal in size. There is mild hypertrophy. Systolic function is normal with an ejection fraction of 60-65%. Wall motion is within normal limits. Indeterminate diastolic function due to incomplete data/conflicting data.  Right Ventricle Right ventricle cavity is normal. Systolic function is normal. Left Atrium Left atrium cavity is moderately dilated. Left atrium volume index is moderately increased. Right Atrium Right atrium cavity is mildly dilated. IVC/SVC The inferior vena cava demonstrates a diameter of <=21 mm and collapses >50%; therefore, the right atrial pressure is estimated at 0-5 mmHg. Mitral Valve Mitral valve structure is normal. There is mild regurgitation. There is no evidence of mitral valve stenosis. Tricuspid Valve Tricuspid valve structure is normal. There is mild regurgitation. There is no evidence of tricuspid valve stenosis. The right ventricular systolic pressure is normal. Aortic Valve The aortic valve is trileaflet. There is mild regurgitation. There is no evidence of aortic valve stenosis. Pulmonic Valve Pulmonic valve structure is normal. There is mild regurgitation. There is no evidence of pulmonic valve stenosis. Ascending Aorta The aortic root is mildly dilated. The ascending aorta is mildly dilated. Pericardium There is no pericardial effusion. Noncardiac Significant Findings: [place] Act 112. Study Details A complete 2D echocardiogram was performed. Color flow, Pulse Wave and Continuous Wave Doppler was performed and analyzed. Overall the study quality was adequate. Prior Study There is a prior study available for comparison. As compared to the previous study, there are changes. Since prior echocardiogram, there is now biatrial enlargement and mild mitral regurgitation and mild aortic insufficiency.

## 2023-10-16 ENCOUNTER — TELEPHONE (OUTPATIENT)
Dept: GASTROENTEROLOGY | Facility: CLINIC | Age: 73
End: 2023-10-16

## 2023-10-16 NOTE — TELEPHONE ENCOUNTER
Scheduled date of colonoscopy (as of today): 11/30/23  Physician performing colonoscopy: sherif  Location of colonoscopy:[facility]  Bow[facility] reviewed with patient[facility]  Instructions reviewed with patient by:arthur  Clearances: eliquis

## 2023-10-16 NOTE — TELEPHONE ENCOUNTER
Left message on machine for patient to call back to schedule EGD/Colonoscopy with Dr. Corey Suárez at [facility]

## 2023-10-16 NOTE — TELEPHONE ENCOUNTER
----- Message from [address] sent at 10/11/2023  3:07 PM EDT -----  Regarding: Schedule EGD/Colonoscopy  Please call patient in 1 week to schedule EGD/Colonoscopy at [facility] with Dr. Michelle Hamlin w/ Miralax/Dulcolax prep if he doesn't call to schedule. He lives at an assisted living and wanted to discuss with their transport staff before scheduling. He also prefers a later day appointment as he helps his wife with morning care. We will need to obtain cardiac clearance from Dr. Derrick Joshua cardiology for procedure and to hold his Eliquis given his hx cardiomyopathy and Eliquis for Afib. Thank you!

## 2023-10-19 ENCOUNTER — TELEPHONE (OUTPATIENT)
Dept: GASTROENTEROLOGY | Facility: CLINIC | Age: 73
End: 2023-10-19

## 2023-10-19 NOTE — TELEPHONE ENCOUNTER
Our mutual patient is scheduled for procedure: colonoscopy and EGD    On: November 30 , 2023     With: Dr. Sonya Root MD     He/She is taking the following blood thinner: Eliquis (Apixaban)    Can this be stopped  days prior to the procedure    Physician Approving clearance:   Faxed clearance to [phone]

## 2023-10-23 NOTE — TELEPHONE ENCOUNTER
Received medication hold for Eliquis from Dr. Marylou Cruz to hold for 2 days prior to procedure.   Left message on machine for patient

## 2023-10-25 DIAGNOSIS — K76.9 LIVER LESION: Primary | ICD-10-CM

## 2023-10-26 ENCOUNTER — TELEPHONE (OUTPATIENT)
Age: 73
End: 2023-10-26

## 2023-10-26 NOTE — TELEPHONE ENCOUNTER
Patients GI provider:  EDIN Danielle    Number to return call: [phone]    Reason for call: Pt calling because he has a colonoscopy schedule for November and Rodrigo Regalado ordered Blood work, stool sample and a CT all of which have been completed, however, the PT just got notified she also wants him to have an MRI of abdomen done. He is fine with the additional test but when he called to schedule it, he was told they could book him for December. He is asking to verify if this need to be done BEFORE the Colon/EGD? He would also like to know why he needs all these tests, if they are just precautionary or something else. Pt is asking for a call to advise.      Scheduled procedure/appointment date if applicable: 14.59.96

## 2023-11-29 RX ORDER — SODIUM CHLORIDE, SODIUM LACTATE, POTASSIUM CHLORIDE, CALCIUM CHLORIDE 600; 310; 30; 20 MG/100ML; MG/100ML; MG/100ML; MG/100ML
125 INJECTION, SOLUTION INTRAVENOUS CONTINUOUS
Status: CANCELLED | OUTPATIENT
Start: 2023-11-29

## 2023-11-30 ENCOUNTER — ANESTHESIA EVENT (OUTPATIENT)
Dept: GASTROENTEROLOGY | Facility: HOSPITAL | Age: 73
End: 2023-11-30

## 2023-11-30 ENCOUNTER — TELEPHONE (OUTPATIENT)
Dept: GASTROENTEROLOGY | Facility: CLINIC | Age: 73
End: 2023-11-30

## 2023-11-30 ENCOUNTER — HOSPITAL ENCOUNTER (OUTPATIENT)
Dept: GASTROENTEROLOGY | Facility: HOSPITAL | Age: 73
Setting detail: OUTPATIENT SURGERY
End: 2023-11-30
Payer: MEDICARE

## 2023-11-30 ENCOUNTER — ANESTHESIA (OUTPATIENT)
Dept: GASTROENTEROLOGY | Facility: HOSPITAL | Age: 73
End: 2023-11-30

## 2023-11-30 VITALS
DIASTOLIC BLOOD PRESSURE: 68 MMHG | OXYGEN SATURATION: 99 % | HEART RATE: 64 BPM | BODY MASS INDEX: 26.61 KG/M2 | RESPIRATION RATE: 15 BRPM | TEMPERATURE: 97.6 F | WEIGHT: 185.9 LBS | SYSTOLIC BLOOD PRESSURE: 125 MMHG | HEIGHT: 70 IN

## 2023-11-30 DIAGNOSIS — D64.9 ANEMIA, UNSPECIFIED TYPE: ICD-10-CM

## 2023-11-30 DIAGNOSIS — R10.30 LOWER ABDOMINAL PAIN: ICD-10-CM

## 2023-11-30 PROCEDURE — 45378 DIAGNOSTIC COLONOSCOPY: CPT | Performed by: INTERNAL MEDICINE

## 2023-11-30 PROCEDURE — 43235 EGD DIAGNOSTIC BRUSH WASH: CPT | Performed by: INTERNAL MEDICINE

## 2023-11-30 RX ORDER — LIDOCAINE HYDROCHLORIDE 10 MG/ML
INJECTION, SOLUTION EPIDURAL; INFILTRATION; INTRACAUDAL; PERINEURAL AS NEEDED
Status: DISCONTINUED | OUTPATIENT
Start: 2023-11-30 | End: 2023-11-30

## 2023-11-30 RX ORDER — SODIUM CHLORIDE, SODIUM LACTATE, POTASSIUM CHLORIDE, CALCIUM CHLORIDE 600; 310; 30; 20 MG/100ML; MG/100ML; MG/100ML; MG/100ML
125 INJECTION, SOLUTION INTRAVENOUS CONTINUOUS
Status: DISCONTINUED | OUTPATIENT
Start: 2023-11-30 | End: 2023-12-04 | Stop reason: HOSPADM

## 2023-11-30 RX ORDER — PROPOFOL 10 MG/ML
INJECTION, EMULSION INTRAVENOUS AS NEEDED
Status: DISCONTINUED | OUTPATIENT
Start: 2023-11-30 | End: 2023-11-30

## 2023-11-30 RX ADMIN — PROPOFOL 80 MG: 10 INJECTION, EMULSION INTRAVENOUS at 09:03

## 2023-11-30 RX ADMIN — PROPOFOL 40 MG: 10 INJECTION, EMULSION INTRAVENOUS at 09:14

## 2023-11-30 RX ADMIN — SODIUM CHLORIDE, SODIUM LACTATE, POTASSIUM CHLORIDE, AND CALCIUM CHLORIDE: .6; .31; .03; .02 INJECTION, SOLUTION INTRAVENOUS at 08:50

## 2023-11-30 RX ADMIN — PROPOFOL 40 MG: 10 INJECTION, EMULSION INTRAVENOUS at 09:08

## 2023-11-30 RX ADMIN — LIDOCAINE HYDROCHLORIDE 50 MG: 10 INJECTION, SOLUTION EPIDURAL; INFILTRATION; INTRACAUDAL; PERINEURAL at 09:03

## 2023-11-30 RX ADMIN — PROPOFOL 40 MG: 10 INJECTION, EMULSION INTRAVENOUS at 09:04

## 2023-11-30 RX ADMIN — PROPOFOL 40 MG: 10 INJECTION, EMULSION INTRAVENOUS at 09:21

## 2023-11-30 NOTE — ANESTHESIA PREPROCEDURE EVALUATION
Procedure:  COLONOSCOPY  EGD    Relevant Problems   CARDIO   (+) Paroxysmal atrial fibrillation (HCC)   (+) Sick sinus syndrome (HCC)      ENDO   (+) Hypothyroidism      /RENAL   (+) Acute renal failure superimposed on stage 3 chronic kidney disease (HCC)      HEMATOLOGY   (+) Thrombocytopenia (HCC)      PULMONARY   (+) JUANI (obstructive sleep apnea)      AICD in place   Chronic anticoagulation   NSVT   Persistent atrial fibrillation (since 4/2022)  Apical variant hypertrophic cardiomyopathy   JUANI  HTN  HLS    9/1/23 Routine remote MDT ICD transmission; Monitoring period (6/30/23-9/1/23); Presenting rhythm=AF-VS; AT/AF burden 100% pt on Eliquis; 0 VHR episodes; Battery estimating 2 years to WILTON; Available lead measurements adequate and stable; Optivol measurements within normal limits; 0% A-paced & 68% V-paced; Appropriately functioning device. Echo 9/2023    Preserved global ejection fraction     Mild concentric left ventricle hypertrophy. Possible apical hypertrophy    Mild aortic insufficiency     Mildly dilated aortic root     Mild mitral regurgitation     Left atrial enlargement     Mild tricuspid regurgitation     Mildly dilated right atrium     Mild pulmonic insufficiency     Physical Exam    Airway    Mallampati score: II  TM Distance: >3 FB  Neck ROM: full     Dental   No notable dental hx     Cardiovascular      Pulmonary      Other Findings        Anesthesia Plan  ASA Score- 3     Anesthesia Type- IV sedation with anesthesia with ASA Monitors. Additional Monitors:     Airway Plan:            Plan Factors-Exercise tolerance (METS): >4 METS. Chart reviewed. EKG reviewed. Existing labs reviewed. Patient summary reviewed. Patient is not a current smoker. Obstructive sleep apnea risk education given perioperatively. Induction- intravenous. Postoperative Plan-     Informed Consent- Anesthetic plan and risks discussed with patient.   I personally reviewed this patient with the CRNA. Discussed and agreed on the Anesthesia Plan with the CRNA. Reyna Voss

## 2023-11-30 NOTE — H&P
History and Physical -  [facility]  Juany Grover Sr. 75 y.o. male MRN: [id number]                  HPI: Juany Grover Sr. is a 75y.o. year old male who presents for upper endoscopy and colonoscopy. Recent drop in hemoglobin. Stool was noted to be heme positive. Patient referred for EGD and colonoscopy. REVIEW OF SYSTEMS: Per the HPI, and otherwise unremarkable.     Historical Information   Past Medical History:   Diagnosis Date    LAKISHA (acute kidney injury) ([address]) 10/01/2019    Allergic rhinitis     Arthritis     Asthma     Atrial fibrillation (HCC)     Chronic anticoagulation     Chronic kidney disease     Colon polyps     Coronary artery disease     CPAP (continuous positive airway pressure) dependence     bi pap    Disease of thyroid gland     GERD (gastroesophageal reflux disease)     Hyperlipidemia     Hypertension     Hypertrophic cardiomegaly     Irregular heart beat     a fib    Pneumonia     Seasonal allergies     Sleep apnea      Past Surgical History:   Procedure Laterality Date    CARDIAC DEFIBRILLATOR PLACEMENT  2018    CATARACT EXTRACTION  2019    CATARACT EXTRACTION W/ INTRAOCULAR LENS IMPLANT Right 04/09/2019    Procedure: EXCHANGE OF IOL;  Surgeon: Hanna Fisher MD;  Location: [facility] MAIN OR;  Service: Ophthalmology    COLONOSCOPY      q 3 years    EGD      FRACTURE SURGERY  1969    Joanne Media / Ruma Must / Susanna Razor RMVL INSJ IO LENS PROSTH W/O ECP Right 03/26/2019    Procedure: DR LOW DELAROSA [facility] W/IOL & LRI;  Surgeon: Hanna Fisher MD;  Location: [facility] MAIN OR;  Service: Ophthalmology    ME XCAPSL CTRC RMVL INSJ IO LENS PROSTH W/O ECP Left 05/07/2019    Procedure: DR LOW DELAROSA [facility] W/IOL & LRI;  Surgeon: Hanna Fisher MD;  Location: [facility] MAIN OR;  Service: Ophthalmology    TONSILLECTOMY       Social History   Social History     Substance and Sexual Activity   Alcohol Use Yes    Comment: occas     Social History     Substance and Sexual Activity   Drug Use Never     Social History Tobacco Use   Smoking Status Never   Smokeless Tobacco Never     Family History   Problem Relation Age of Onset    Colon cancer Mother        Meds/Allergies       Current Outpatient Medications:     albuterol (PROVENTIL HFA,VENTOLIN HFA) 90 mcg/act inhaler    ALPRAZolam (XANAX) 0.5 mg tablet    azelastine (ASTELIN) 0.1 % nasal spray    carvedilol (COREG) 25 mg tablet    esomeprazole (NexIUM) 20 mg capsule    Flaxseed, Linseed, (FLAXSEED OIL) 1000 MG CAPS    fluticasone (FLONASE) 50 mcg/act nasal spray    levothyroxine 112 mcg tablet    lisinopril (ZESTRIL) 40 mg tablet    loratadine (CLARITIN) 10 mg tablet    Melatonin 10 MG TABS    ondansetron (Zofran ODT) 4 mg disintegrating tablet    simvastatin (ZOCOR) 20 mg tablet    SYMBICORT 160-4.5 MCG/ACT inhaler    zolpidem (AMBIEN) 5 mg tablet    amLODIPine (NORVASC) 2.5 mg tablet    COENZYME Q10-OMEGA 3 FATTY ACD PO    ELIQUIS 5 MG    TiZANidine (ZANAFLEX) 2 MG capsule    travoprost (TRAVATAN-Z) 0.004 % ophthalmic solution    Current Facility-Administered Medications:     lactated ringers infusion, 125 mL/hr, Intravenous, Continuous    Allergies   Allergen Reactions    Procardia [Nifedipine] Palpitations       Objective     /79   Pulse 67 Comment: Afib w/ PVCs. Dr. Jessi Segovia made aware, no new orders  Temp (!) 96.8 °F (36 °C) (Temporal)   Resp 15   Ht 5' 10" (1.778 m)   Wt 84.3 kg (185 lb 14.4 oz)   SpO2 98%   BMI 26.67 kg/m²       PHYSICAL EXAM    Gen: NAD  Head: NCAT  CV: RRR  CHEST: Clear  ABD: soft, NT/ND  EXT: no edema      ASSESSMENT/PLAN:  This is a 75y.o. year old male here for EGD and colonoscopy, and he is stable and optimized for his procedure.

## 2023-11-30 NOTE — ANESTHESIA POSTPROCEDURE EVALUATION
Post-Op Assessment Note    CV Status:  Stable  Pain Score: 0    Pain management: adequate       Mental Status:  Sleepy and arousable   Hydration Status:  Euvolemic   PONV Controlled:  Controlled   Airway Patency:  Patent     Post Op Vitals Reviewed: Yes    No anethesia notable event occurred.     Staff: CRNA               /58 (11/30/23 0927)    Temp 97.8 °F (36.6 °C) (11/30/23 0927)    Pulse 68 (11/30/23 0927)   Resp 13 (11/30/23 0927)    SpO2 96 % (11/30/23 0927)

## 2023-11-30 NOTE — INTERVAL H&P NOTE
H&P reviewed. After examining the patient I find no changes in the patients condition since the H&P had been written.     Vitals:    11/30/23 0806   BP: 136/79   Pulse: 67   Resp: 15   Temp: (!) 96.8 °F (36 °C)   SpO2: 98%

## 2023-11-30 NOTE — TELEPHONE ENCOUNTER
Patients GI provider:  Dr. Manohar Medina    Number to return call: [phone]    Reason for call: Pt calling to schedule capsule endoscopy. Order is in system.     Scheduled procedure/appointment date if applicable: none

## 2023-11-30 NOTE — DISCHARGE SUMMARY
Discharge Summary - Janelle Sparrow Sr. 75 y.o. male MRN: [id number]    Unit/Bed#:  Encounter: [id number]    Admission Date: 11/30/2023    Admitting Diagnosis: Anemia, unspecified type [D64.9]  Lower abdominal pain [R10.30]    HPI: Heme positive stool and anemia    Procedures Performed: No orders of the defined types were placed in this encounter. Summary of Hospital Course: Tolerated procedure well    Significant Findings, Care, Treatment and Services Provided: Small sliding hiatal hernia and mild gastritis. Diverticulosis of the colon. No bleeding lesion identified. Complications: None    Discharge Diagnosis: See above    Medical Problems       Resolved Problems  Date Reviewed: 8/21/2023   None         Condition at Discharge: good         Discharge instructions/Information to patient and family:   See after visit summary for information provided to patient and family. Provisions for Follow-Up Care:  See after visit summary for information related to follow-up care and any pertinent home health orders.       PCP: Radu Champagne MD    Disposition: Home

## 2023-12-01 NOTE — TELEPHONE ENCOUNTER
I returned call, reviewed capsule endoscopy procedure and reason for study. Patient will complete test and I provided central scheduling number to patient to schedule.

## 2023-12-01 NOTE — TELEPHONE ENCOUNTER
Called pt to provide central scheduling phone number. Pt stated he does not know what a capsule endoscopy is or what they are looking for. Pt states Dr. Clarisse Sheets advised this is something he should consider. Please call and explain to pt what capsule endoscopy is.  Pt can be reached at [phone]

## 2023-12-20 ENCOUNTER — HOSPITAL ENCOUNTER (OUTPATIENT)
Dept: GASTROENTEROLOGY | Facility: HOSPITAL | Age: 73
Discharge: HOME | End: 2023-12-20
Attending: INTERNAL MEDICINE
Payer: MEDICARE

## 2023-12-20 DIAGNOSIS — D64.9 ANEMIA, UNSPECIFIED TYPE: ICD-10-CM

## 2023-12-20 DIAGNOSIS — K92.1 GASTROINTESTINAL HEMORRHAGE WITH MELENA: ICD-10-CM

## 2023-12-20 PROCEDURE — 91110 GI TRC IMG INTRAL ESOPH-ILE: CPT

## 2023-12-20 NOTE — PERIOPERATIVE NURSING NOTE
Patient swallowed capsule without effort and visualized in stomach.  Patient understanding to return unit at 153

## 2023-12-26 PROCEDURE — 91035 G-ESOPH REFLX TST W/ELECTROD: CPT | Performed by: INTERNAL MEDICINE

## 2023-12-27 ENCOUNTER — TELEPHONE (OUTPATIENT)
Age: 73
End: 2023-12-27

## 2023-12-27 NOTE — TELEPHONE ENCOUNTER
Patients GI provider: DILLAN Danielle    Number to return call: [phone]    Reason for call: Pt called regard to Capsule Endoscopy results. Pt stated he would like a call back when the results are back.     Scheduled procedure/appointment date if applicable: N/A

## 2023-12-29 ENCOUNTER — TELEPHONE (OUTPATIENT)
Age: 73
End: 2023-12-29

## 2023-12-29 NOTE — TELEPHONE ENCOUNTER
Pt called, asking if GI provider will order follow up CBC, Iron labs to re-evaluate need for repeat VCE.    Pt can be reached at cell [phone]. Ok to leave detailed message.     Pt uses [facility]. Orders can be mailed.

## 2024-01-05 DIAGNOSIS — D64.9 ANEMIA, UNSPECIFIED TYPE: Primary | ICD-10-CM

## 2024-01-05 NOTE — TELEPHONE ENCOUNTER
Patient called requesting to speak with someone regarding an order for blood count. Called triage nurse and spoke with Mireille.     Mireille will send a message to the provider and once it is placed they will call the patient.

## 2025-07-06 ENCOUNTER — OFFICE VISIT (OUTPATIENT)
Dept: URGENT CARE | Age: 75
End: 2025-07-06
Payer: MEDICARE

## 2025-07-06 VITALS
HEART RATE: 86 BPM | SYSTOLIC BLOOD PRESSURE: 159 MMHG | RESPIRATION RATE: 20 BRPM | OXYGEN SATURATION: 98 % | DIASTOLIC BLOOD PRESSURE: 80 MMHG | TEMPERATURE: 97.9 F

## 2025-07-06 DIAGNOSIS — T16.1XXA FOREIGN BODY OF RIGHT EAR, INITIAL ENCOUNTER: Primary | ICD-10-CM

## 2025-07-06 PROCEDURE — 69200 CLEAR OUTER EAR CANAL: CPT | Performed by: PHYSICIAN ASSISTANT

## 2025-07-06 PROCEDURE — G0463 HOSPITAL OUTPT CLINIC VISIT: HCPCS | Performed by: PHYSICIAN ASSISTANT

## 2025-07-06 PROCEDURE — 99213 OFFICE O/P EST LOW 20 MIN: CPT | Performed by: PHYSICIAN ASSISTANT

## 2025-07-06 NOTE — PROGRESS NOTES
[facility]  Name: Wilder Tirado Sr.      : 1950      MRN: [id number]  Encounter Provider: Rhonda Milner PA-C  Encounter Date: 2025   Encounter department: [facility] [place]  :  Assessment & Plan  Foreign body of right ear, initial encounter           Universal Protocol:  procedure performed by consultantConsent: Verbal consent obtained  Risks and benefits: risks, benefits and alternatives were discussed  Consent given by: patient  Patient identity confirmed: verbally with patient  Foreign body removal    Date/Time: 2025 6:00 PM    Performed by: Rhonda Milner PA-C  Authorized by: Rhonda Milner PA-C  Body area: ear  Location details: right ear  Removal mechanism: alligator forceps  Complexity: simple  Post-procedure assessment: foreign body removed  Patient tolerance: patient tolerated the procedure well with no immediate complications  Comments: Right hearing aid removed without complications.           Patient Instructions    Keep right ear clean and dry.    Follow up with PCP in 3-5 days.  Proceed to  ER if symptoms worsen.    If tests are performed, our office will contact you with results only if changes need to made to the care plan discussed with you at the visit. [facility]    Chief Complaint:   Chief Complaint   Patient presents with    Foreign Body in Ear     Pt has hearing aide stuck in his right ear. The string to help remove is missing.      History of Present Illness   Patient presents today complaining of hearing aid stuck in right ear. Patient reports he started wearing these new hearing aids one week ago and today it got stuck. Denies any other sick symptoms. Admits allergies to procardia.     Foreign Body in Ear          Review of Systems   Constitutional: Negative.    HENT:          Foreign body in right ear   Respiratory: Negative.     Cardiovascular: Negative.     Psychiatric/Behavioral: Negative.       Past Medical History   Past Medical History[1]  Past Surgical History[2]  Family History[3]  he reports that he has never smoked. He has never used smokeless tobacco. He reports current alcohol use. He reports that he does not use drugs.  Current Outpatient Medications   Medication Instructions    albuterol (PROVENTIL HFA,VENTOLIN HFA) 90 mcg/act inhaler 2 puffs, Every 6 hours PRN    ALPRAZolam (XANAX) 0.5 mg, Daily PRN    amLODIPine (NORVASC) 2.5 mg tablet No dose, route, or frequency recorded.    azelastine (ASTELIN) 0.1 % nasal spray 1 spray, Nasal, 2 times daily, Use in each nostril as directed    carvedilol (COREG) 25 mg tablet No dose, route, or frequency recorded.    COENZYME Q10-OMEGA 3 FATTY ACD PO 2,000 mg, Daily    Eliquis 5 mg, 2 times daily    esomeprazole (NEXIUM) 20 mg, Oral, Daily (early morning), Take 1/2 hour before breakfast    Flaxseed Oil 2,000 mg, Daily    fluticasone (FLONASE) 50 mcg/act nasal spray 2 sprays, Nasal, Daily    levothyroxine 112 mcg, Daily    lisinopril (ZESTRIL) 40 mg, Oral, Daily    loratadine (CLARITIN) 10 mg, Daily    Melatonin 10 MG TABS Take by mouth    Omega-3 Fatty Acids (Fish Oil) 300 MG CAPS Take by mouth    ondansetron (ZOFRAN ODT) 4 mg, Oral, Every 6 hours PRN    simvastatin (ZOCOR) 20 mg, Daily at bedtime    SYMBICORT 160-4.5 MCG/ACT inhaler 2 puffs, 2 times daily    TiZANidine (ZANAFLEX) 2 mg, Oral, Daily at bedtime PRN    travoprost (TRAVATAN-Z) 0.004 % ophthalmic solution 1 drop, Daily at bedtime    zolpidem (AMBIEN) 5 mg tablet No dose, route, or frequency recorded.   Allergies[4]     Objective   /80   Pulse 86   Temp 97.9 °F (36.6 °C) (Tympanic)   Resp 20   SpO2 98%      Physical Exam  Vitals and nursing note reviewed.   Constitutional:       Appearance: Normal appearance.   HENT:      Head: Normocephalic.      Left Ear: Tympanic membrane, ear canal and external ear normal.      Ears:      Comments: Once  "foreign body was removed from right ear TM showed no signs of infection    Cardiovascular:      Rate and Rhythm: Normal rate and regular rhythm.      Heart sounds: Normal heart sounds.   Pulmonary:      Breath sounds: Normal breath sounds. No wheezing.     Neurological:      General: No focal deficit present.      Mental Status: He is alert and oriented to person, place, and time.     Psychiatric:         Mood and Affect: Mood normal.         Behavior: Behavior normal.         Portions of the record may have been created with voice recognition software.  Occasional wrong word or \"sound a like\" substitutions may have occurred due to the inherent limitations of voice recognition software.  Read the chart carefully and recognize, using context, where substitutions have occurred.         [1]   Past Medical History:  Diagnosis Date    LAKISHA (acute kidney injury) ([facility]) 10/01/2019    Allergic rhinitis     Arthritis     Asthma     Atrial fibrillation (HCC)     Chronic anticoagulation     Chronic kidney disease     Colon polyps     Coronary artery disease     CPAP (continuous positive airway pressure) dependence     bi pap    Disease of thyroid gland     GERD (gastroesophageal reflux disease)     Hyperlipidemia     Hypertension     Hypertrophic cardiomegaly     Irregular heart beat     a fib    Pneumonia     Seasonal allergies     Sleep apnea    [2]   Past Surgical History:  Procedure Laterality Date    CARDIAC DEFIBRILLATOR PLACEMENT  2018    CATARACT EXTRACTION  2019    CATARACT EXTRACTION W/ INTRAOCULAR LENS IMPLANT Right 04/09/2019    Procedure: EXCHANGE OF IOL;  Surgeon: Mariah Presley MD;  Location:  MAIN OR;  Service: Ophthalmology    COLONOSCOPY      q 3 years    EGD      FRACTURE SURGERY  1969    INSERT / REPLACE / REMOVE PACEMAKER      MT XCAPSL CTRC RMVL INSJ IO LENS PROSTH W/O ECP Right 03/26/2019    Procedure: PHACO W/IOL & LRI;  Surgeon: Mariah Presley MD;  Location:  MAIN OR;  Service: Ophthalmology    MT XCAPSL " CTRC RMVL INSJ IO LENS PROSTH W/O ECP Left 05/07/2019    Procedure: PHACO W/IOL & LRI;  Surgeon: Mariah Presley MD;  Location:  MAIN OR;  Service: Ophthalmology    TONSILLECTOMY     [3]   Family History  Problem Relation Name Age of Onset    Colon cancer Mother Charissa    [4]   Allergies  Allergen Reactions    Procardia [Nifedipine] Palpitations

## (undated) DEVICE — STERILE POLYISOPRENE POWDER-FREE SURGICAL GLOVES: Brand: PROTEXIS

## (undated) DEVICE — 3M™ TEGADERM™ TRANSPARENT FILM DRESSING FRAME STYLE, 1624W, 2-3/8 IN X 2-3/4 IN (6 CM X 7 CM), 100/CT 4CT/CASE: Brand: 3M™ TEGADERM™

## (undated) DEVICE — RING CORNEA FIXATION CLEAR

## (undated) DEVICE — LAMINECTOMY ARM CRADLE FOAM POSITIONER: Brand: CARDINAL HEALTH

## (undated) DEVICE — NEEDLE FILTER 5 MICR 19G X 1.5IN

## (undated) DEVICE — PACK PIC GENERIC

## (undated) DEVICE — NEEDLE 25G X 5/8 SAFETY

## (undated) DEVICE — NEEDLE BLUNT 18 G X 1 1/2IN

## (undated) DEVICE — PACK CUSTOM EYE BASIC

## (undated) DEVICE — CYSTOTOME IRRIGATION 25G

## (undated) DEVICE — BLADE CORNEA CLEAR 2.8

## (undated) DEVICE — EYE PADS 1 5/8"X2 5/8": Brand: MCKESSON

## (undated) DEVICE — MICROSURGICAL INSTRUMENT ANTERIOR CHAMBER CANNULA 27GA: Brand: ALCON

## (undated) DEVICE — OPHTHALMIC KNIFE 15°: Brand: ALCON

## (undated) DEVICE — OCUCOAT

## (undated) DEVICE — KNIFE 600 MICRON ACCURATE DPTH

## (undated) DEVICE — BLADE MICRO SHARP 5.0MM X 15 DEG

## (undated) DEVICE — SYRINGE 3ML LL

## (undated) DEVICE — 30° ROUND, 0.9 MM TURBOSONICS® TAPERED ABS® MICROTIP™ TIP: Brand: ALCON, TURBOSONICS, TAPERED ABS, MICROTIP

## (undated) DEVICE — THE MONARCH® "B" CARTRIDGE IS A SINGLE-USE POLYPROPYLENE CARTRIDGE FOR POSTERIOR CHAMBER IOL DELIVERY: Brand: MONARCH® II

## (undated) DEVICE — SPECIMEN CONTAINER STERILE PEEL PACK

## (undated) DEVICE — THE MONARCH® "D" CARTRIDGE IS A SINGLE-USE POLYPROPYLENE CARTRIDGE FOR POSTERIOR CHAMBER IOL DELIVERY: Brand: MONARCH® III

## (undated) DEVICE — UNDERGLOVE PROTEXIS  BLUE SZ 7